# Patient Record
Sex: FEMALE | Race: WHITE | NOT HISPANIC OR LATINO
[De-identification: names, ages, dates, MRNs, and addresses within clinical notes are randomized per-mention and may not be internally consistent; named-entity substitution may affect disease eponyms.]

---

## 2018-04-24 ENCOUNTER — APPOINTMENT (OUTPATIENT)
Dept: HEART AND VASCULAR | Facility: CLINIC | Age: 83
End: 2018-04-24
Payer: MEDICARE

## 2018-04-24 VITALS
WEIGHT: 125 LBS | TEMPERATURE: 98.4 F | BODY MASS INDEX: 19.85 KG/M2 | RESPIRATION RATE: 14 BRPM | SYSTOLIC BLOOD PRESSURE: 128 MMHG | HEIGHT: 66.5 IN | OXYGEN SATURATION: 98 % | DIASTOLIC BLOOD PRESSURE: 75 MMHG | HEART RATE: 78 BPM

## 2018-04-24 DIAGNOSIS — Z87.891 PERSONAL HISTORY OF NICOTINE DEPENDENCE: ICD-10-CM

## 2018-04-24 DIAGNOSIS — Z78.9 OTHER SPECIFIED HEALTH STATUS: ICD-10-CM

## 2018-04-24 PROBLEM — Z00.00 ENCOUNTER FOR PREVENTIVE HEALTH EXAMINATION: Status: ACTIVE | Noted: 2018-04-24

## 2018-04-24 PROCEDURE — 99205 OFFICE O/P NEW HI 60 MIN: CPT | Mod: 25

## 2018-04-24 PROCEDURE — 93306 TTE W/DOPPLER COMPLETE: CPT

## 2018-05-29 ENCOUNTER — APPOINTMENT (OUTPATIENT)
Dept: HEART AND VASCULAR | Facility: CLINIC | Age: 83
End: 2018-05-29
Payer: MEDICARE

## 2018-05-29 VITALS
HEART RATE: 76 BPM | WEIGHT: 126 LBS | BODY MASS INDEX: 20.01 KG/M2 | OXYGEN SATURATION: 98 % | RESPIRATION RATE: 14 BRPM | TEMPERATURE: 98.5 F | HEIGHT: 66.5 IN

## 2018-05-29 PROCEDURE — 99214 OFFICE O/P EST MOD 30 MIN: CPT | Mod: 25

## 2018-05-29 PROCEDURE — 93000 ELECTROCARDIOGRAM COMPLETE: CPT

## 2018-05-29 PROCEDURE — 36415 COLL VENOUS BLD VENIPUNCTURE: CPT

## 2018-05-30 LAB
ALBUMIN SERPL ELPH-MCNC: 4.7 G/DL
ALP BLD-CCNC: 78 U/L
ALT SERPL-CCNC: 13 U/L
ANION GAP SERPL CALC-SCNC: 17 MMOL/L
AST SERPL-CCNC: 20 U/L
BASOPHILS # BLD AUTO: 0.03 K/UL
BASOPHILS NFR BLD AUTO: 0.4 %
BILIRUB SERPL-MCNC: 0.4 MG/DL
BUN SERPL-MCNC: 14 MG/DL
CALCIUM SERPL-MCNC: 10 MG/DL
CHLORIDE SERPL-SCNC: 101 MMOL/L
CHOLEST SERPL-MCNC: 288 MG/DL
CHOLEST/HDLC SERPL: 2.6 RATIO
CO2 SERPL-SCNC: 25 MMOL/L
CREAT SERPL-MCNC: 0.8 MG/DL
EOSINOPHIL # BLD AUTO: 0.12 K/UL
EOSINOPHIL NFR BLD AUTO: 1.6 %
GLUCOSE SERPL-MCNC: 93 MG/DL
HBA1C MFR BLD HPLC: 5.7 %
HCT VFR BLD CALC: 46 %
HDLC SERPL-MCNC: 110 MG/DL
HGB BLD-MCNC: 14.3 G/DL
IMM GRANULOCYTES NFR BLD AUTO: 0.1 %
LDLC SERPL CALC-MCNC: 162 MG/DL
LYMPHOCYTES # BLD AUTO: 2.19 K/UL
LYMPHOCYTES NFR BLD AUTO: 28.4 %
MAN DIFF?: NORMAL
MCHC RBC-ENTMCNC: 29.3 PG
MCHC RBC-ENTMCNC: 31.1 GM/DL
MCV RBC AUTO: 94.3 FL
MONOCYTES # BLD AUTO: 0.64 K/UL
MONOCYTES NFR BLD AUTO: 8.3 %
NEUTROPHILS # BLD AUTO: 4.73 K/UL
NEUTROPHILS NFR BLD AUTO: 61.2 %
PLATELET # BLD AUTO: 200 K/UL
POTASSIUM SERPL-SCNC: 4.9 MMOL/L
PROT SERPL-MCNC: 7.6 G/DL
RBC # BLD: 4.88 M/UL
RBC # FLD: 15.3 %
SODIUM SERPL-SCNC: 143 MMOL/L
TRIGL SERPL-MCNC: 81 MG/DL
TSH SERPL-ACNC: 2.58 UIU/ML
WBC # FLD AUTO: 7.72 K/UL

## 2018-07-06 ENCOUNTER — INPATIENT (INPATIENT)
Facility: HOSPITAL | Age: 83
LOS: 5 days | Discharge: NURSING, REHAB OR LONG-TERM CARE | DRG: 470 | End: 2018-07-12
Attending: ORTHOPAEDIC SURGERY | Admitting: ORTHOPAEDIC SURGERY
Payer: MEDICARE

## 2018-07-06 VITALS
TEMPERATURE: 98 F | SYSTOLIC BLOOD PRESSURE: 178 MMHG | OXYGEN SATURATION: 95 % | HEART RATE: 89 BPM | RESPIRATION RATE: 18 BRPM | DIASTOLIC BLOOD PRESSURE: 117 MMHG | WEIGHT: 119.93 LBS

## 2018-07-06 LAB
ALBUMIN SERPL ELPH-MCNC: 4.3 G/DL — SIGNIFICANT CHANGE UP (ref 3.3–5)
ALP SERPL-CCNC: 66 U/L — SIGNIFICANT CHANGE UP (ref 40–120)
ALT FLD-CCNC: 37 U/L — SIGNIFICANT CHANGE UP (ref 10–45)
ANION GAP SERPL CALC-SCNC: 16 MMOL/L — SIGNIFICANT CHANGE UP (ref 5–17)
APPEARANCE UR: CLEAR — SIGNIFICANT CHANGE UP
APTT BLD: 34.9 SEC — SIGNIFICANT CHANGE UP (ref 27.5–37.4)
AST SERPL-CCNC: 26 U/L — SIGNIFICANT CHANGE UP (ref 10–40)
BASOPHILS NFR BLD AUTO: 0.1 % — SIGNIFICANT CHANGE UP (ref 0–2)
BILIRUB SERPL-MCNC: 0.3 MG/DL — SIGNIFICANT CHANGE UP (ref 0.2–1.2)
BILIRUB UR-MCNC: NEGATIVE — SIGNIFICANT CHANGE UP
BLD GP AB SCN SERPL QL: NEGATIVE — SIGNIFICANT CHANGE UP
BUN SERPL-MCNC: 22 MG/DL — SIGNIFICANT CHANGE UP (ref 7–23)
CALCIUM SERPL-MCNC: 9.8 MG/DL — SIGNIFICANT CHANGE UP (ref 8.4–10.5)
CHLORIDE SERPL-SCNC: 98 MMOL/L — SIGNIFICANT CHANGE UP (ref 96–108)
CO2 SERPL-SCNC: 24 MMOL/L — SIGNIFICANT CHANGE UP (ref 22–31)
COLOR SPEC: YELLOW — SIGNIFICANT CHANGE UP
CREAT SERPL-MCNC: 0.74 MG/DL — SIGNIFICANT CHANGE UP (ref 0.5–1.3)
DIFF PNL FLD: ABNORMAL
GLUCOSE SERPL-MCNC: 155 MG/DL — HIGH (ref 70–99)
GLUCOSE UR QL: NEGATIVE — SIGNIFICANT CHANGE UP
HCT VFR BLD CALC: 42.1 % — SIGNIFICANT CHANGE UP (ref 34.5–45)
HGB BLD-MCNC: 13.6 G/DL — SIGNIFICANT CHANGE UP (ref 11.5–15.5)
INR BLD: 1.17 — HIGH (ref 0.88–1.16)
KETONES UR-MCNC: ABNORMAL MG/DL
LEUKOCYTE ESTERASE UR-ACNC: NEGATIVE — SIGNIFICANT CHANGE UP
LYMPHOCYTES # BLD AUTO: 14 % — SIGNIFICANT CHANGE UP (ref 13–44)
MCHC RBC-ENTMCNC: 28.8 PG — SIGNIFICANT CHANGE UP (ref 27–34)
MCHC RBC-ENTMCNC: 32.3 G/DL — SIGNIFICANT CHANGE UP (ref 32–36)
MCV RBC AUTO: 89.2 FL — SIGNIFICANT CHANGE UP (ref 80–100)
MONOCYTES NFR BLD AUTO: 9 % — SIGNIFICANT CHANGE UP (ref 2–14)
MRSA PCR RESULT.: NEGATIVE — SIGNIFICANT CHANGE UP
NEUTROPHILS NFR BLD AUTO: 76.9 % — SIGNIFICANT CHANGE UP (ref 43–77)
NITRITE UR-MCNC: NEGATIVE — SIGNIFICANT CHANGE UP
PH UR: 6 — SIGNIFICANT CHANGE UP (ref 5–8)
PLATELET # BLD AUTO: 224 K/UL — SIGNIFICANT CHANGE UP (ref 150–400)
POTASSIUM SERPL-MCNC: 4.2 MMOL/L — SIGNIFICANT CHANGE UP (ref 3.5–5.3)
POTASSIUM SERPL-SCNC: 4.2 MMOL/L — SIGNIFICANT CHANGE UP (ref 3.5–5.3)
PROT SERPL-MCNC: 7.4 G/DL — SIGNIFICANT CHANGE UP (ref 6–8.3)
PROT UR-MCNC: ABNORMAL MG/DL
PROTHROM AB SERPL-ACNC: 13 SEC — HIGH (ref 9.8–12.7)
RBC # BLD: 4.72 M/UL — SIGNIFICANT CHANGE UP (ref 3.8–5.2)
RBC # FLD: 14 % — SIGNIFICANT CHANGE UP (ref 10.3–16.9)
RH IG SCN BLD-IMP: POSITIVE — SIGNIFICANT CHANGE UP
S AUREUS DNA NOSE QL NAA+PROBE: POSITIVE
SODIUM SERPL-SCNC: 138 MMOL/L — SIGNIFICANT CHANGE UP (ref 135–145)
SP GR SPEC: 1.01 — SIGNIFICANT CHANGE UP (ref 1–1.03)
UROBILINOGEN FLD QL: 0.2 E.U./DL — SIGNIFICANT CHANGE UP
WBC # BLD: 10.1 K/UL — SIGNIFICANT CHANGE UP (ref 3.8–10.5)
WBC # FLD AUTO: 10.1 K/UL — SIGNIFICANT CHANGE UP (ref 3.8–10.5)

## 2018-07-06 PROCEDURE — 71045 X-RAY EXAM CHEST 1 VIEW: CPT | Mod: 26

## 2018-07-06 PROCEDURE — 70450 CT HEAD/BRAIN W/O DYE: CPT | Mod: 26

## 2018-07-06 PROCEDURE — 73502 X-RAY EXAM HIP UNI 2-3 VIEWS: CPT | Mod: 26,LT

## 2018-07-06 PROCEDURE — 93010 ELECTROCARDIOGRAM REPORT: CPT

## 2018-07-06 PROCEDURE — 99285 EMERGENCY DEPT VISIT HI MDM: CPT | Mod: 25

## 2018-07-06 PROCEDURE — 72170 X-RAY EXAM OF PELVIS: CPT | Mod: 26,59

## 2018-07-06 PROCEDURE — 99222 1ST HOSP IP/OBS MODERATE 55: CPT

## 2018-07-06 PROCEDURE — 72192 CT PELVIS W/O DYE: CPT | Mod: 26

## 2018-07-06 PROCEDURE — 93306 TTE W/DOPPLER COMPLETE: CPT | Mod: 26

## 2018-07-06 RX ORDER — BIMATOPROST 0.3 MG/ML
1 SOLUTION/ DROPS OPHTHALMIC
Qty: 0 | Refills: 0 | COMMUNITY

## 2018-07-06 RX ORDER — TRAMADOL HYDROCHLORIDE 50 MG/1
50 TABLET ORAL EVERY 4 HOURS
Qty: 0 | Refills: 0 | Status: DISCONTINUED | OUTPATIENT
Start: 2018-07-06 | End: 2018-07-12

## 2018-07-06 RX ORDER — ACETAZOLAMIDE 250 MG/1
1 TABLET ORAL
Qty: 0 | Refills: 0 | COMMUNITY

## 2018-07-06 RX ORDER — SODIUM CHLORIDE 9 MG/ML
1000 INJECTION, SOLUTION INTRAVENOUS
Qty: 0 | Refills: 0 | Status: DISCONTINUED | OUTPATIENT
Start: 2018-07-08 | End: 2018-07-09

## 2018-07-06 RX ORDER — THYROID 120 MG
1 TABLET ORAL
Qty: 0 | Refills: 0 | COMMUNITY

## 2018-07-06 RX ORDER — THYROID 120 MG
30 TABLET ORAL DAILY
Qty: 0 | Refills: 0 | Status: DISCONTINUED | OUTPATIENT
Start: 2018-07-06 | End: 2018-07-12

## 2018-07-06 RX ORDER — HYDROMORPHONE HYDROCHLORIDE 2 MG/ML
0.5 INJECTION INTRAMUSCULAR; INTRAVENOUS; SUBCUTANEOUS EVERY 6 HOURS
Qty: 0 | Refills: 0 | Status: DISCONTINUED | OUTPATIENT
Start: 2018-07-06 | End: 2018-07-06

## 2018-07-06 RX ORDER — METOCLOPRAMIDE HCL 10 MG
10 TABLET ORAL EVERY 6 HOURS
Qty: 0 | Refills: 0 | Status: DISCONTINUED | OUTPATIENT
Start: 2018-07-06 | End: 2018-07-12

## 2018-07-06 RX ORDER — POLYETHYLENE GLYCOL 3350 17 G/17G
17 POWDER, FOR SOLUTION ORAL DAILY
Qty: 0 | Refills: 0 | Status: DISCONTINUED | OUTPATIENT
Start: 2018-07-06 | End: 2018-07-12

## 2018-07-06 RX ORDER — METOPROLOL TARTRATE 50 MG
50 TABLET ORAL
Qty: 0 | Refills: 0 | Status: DISCONTINUED | OUTPATIENT
Start: 2018-07-06 | End: 2018-07-12

## 2018-07-06 RX ORDER — SENNA PLUS 8.6 MG/1
2 TABLET ORAL AT BEDTIME
Qty: 0 | Refills: 0 | Status: DISCONTINUED | OUTPATIENT
Start: 2018-07-06 | End: 2018-07-12

## 2018-07-06 RX ORDER — SODIUM CHLORIDE 9 MG/ML
1000 INJECTION, SOLUTION INTRAVENOUS
Qty: 0 | Refills: 0 | Status: DISCONTINUED | OUTPATIENT
Start: 2018-07-06 | End: 2018-07-06

## 2018-07-06 RX ORDER — ACETAMINOPHEN 500 MG
650 TABLET ORAL EVERY 6 HOURS
Qty: 0 | Refills: 0 | Status: DISCONTINUED | OUTPATIENT
Start: 2018-07-06 | End: 2018-07-12

## 2018-07-06 RX ORDER — HEPARIN SODIUM 5000 [USP'U]/ML
5000 INJECTION INTRAVENOUS; SUBCUTANEOUS EVERY 8 HOURS
Qty: 0 | Refills: 0 | Status: DISCONTINUED | OUTPATIENT
Start: 2018-07-06 | End: 2018-07-08

## 2018-07-06 RX ORDER — TRAMADOL HYDROCHLORIDE 50 MG/1
25 TABLET ORAL EVERY 4 HOURS
Qty: 0 | Refills: 0 | Status: DISCONTINUED | OUTPATIENT
Start: 2018-07-06 | End: 2018-07-12

## 2018-07-06 RX ORDER — ERGOCALCIFEROL 1.25 MG/1
50000 CAPSULE ORAL
Qty: 0 | Refills: 0 | Status: DISCONTINUED | OUTPATIENT
Start: 2018-07-06 | End: 2018-07-09

## 2018-07-06 RX ORDER — MORPHINE SULFATE 50 MG/1
2 CAPSULE, EXTENDED RELEASE ORAL EVERY 4 HOURS
Qty: 0 | Refills: 0 | Status: DISCONTINUED | OUTPATIENT
Start: 2018-07-06 | End: 2018-07-12

## 2018-07-06 RX ORDER — ACETAMINOPHEN 500 MG
650 TABLET ORAL EVERY 6 HOURS
Qty: 0 | Refills: 0 | Status: DISCONTINUED | OUTPATIENT
Start: 2018-07-06 | End: 2018-07-09

## 2018-07-06 RX ORDER — ACETAZOLAMIDE 250 MG/1
250 TABLET ORAL
Qty: 0 | Refills: 0 | Status: DISCONTINUED | OUTPATIENT
Start: 2018-07-06 | End: 2018-07-12

## 2018-07-06 RX ORDER — DOCUSATE SODIUM 100 MG
100 CAPSULE ORAL THREE TIMES A DAY
Qty: 0 | Refills: 0 | Status: DISCONTINUED | OUTPATIENT
Start: 2018-07-06 | End: 2018-07-12

## 2018-07-06 RX ORDER — CHOLECALCIFEROL (VITAMIN D3) 125 MCG
0 CAPSULE ORAL
Qty: 0 | Refills: 0 | COMMUNITY

## 2018-07-06 RX ORDER — HYDROMORPHONE HYDROCHLORIDE 2 MG/ML
2 INJECTION INTRAMUSCULAR; INTRAVENOUS; SUBCUTANEOUS EVERY 4 HOURS
Qty: 0 | Refills: 0 | Status: DISCONTINUED | OUTPATIENT
Start: 2018-07-06 | End: 2018-07-06

## 2018-07-06 RX ORDER — LEVOTHYROXINE SODIUM 125 MCG
0 TABLET ORAL
Qty: 0 | Refills: 0 | COMMUNITY

## 2018-07-06 RX ORDER — LATANOPROST 0.05 MG/ML
1 SOLUTION/ DROPS OPHTHALMIC; TOPICAL AT BEDTIME
Qty: 0 | Refills: 0 | Status: DISCONTINUED | OUTPATIENT
Start: 2018-07-06 | End: 2018-07-09

## 2018-07-06 RX ORDER — APIXABAN 2.5 MG/1
1 TABLET, FILM COATED ORAL
Qty: 0 | Refills: 0 | COMMUNITY

## 2018-07-06 RX ORDER — BIMATOPROST 0.3 MG/ML
1 SOLUTION/ DROPS OPHTHALMIC AT BEDTIME
Qty: 0 | Refills: 0 | Status: DISCONTINUED | OUTPATIENT
Start: 2018-07-06 | End: 2018-07-12

## 2018-07-06 RX ORDER — FAMOTIDINE 10 MG/ML
20 INJECTION INTRAVENOUS EVERY 12 HOURS
Qty: 0 | Refills: 0 | Status: DISCONTINUED | OUTPATIENT
Start: 2018-07-06 | End: 2018-07-06

## 2018-07-06 RX ORDER — METOPROLOL TARTRATE 50 MG
1 TABLET ORAL
Qty: 0 | Refills: 0 | COMMUNITY

## 2018-07-06 RX ADMIN — HEPARIN SODIUM 5000 UNIT(S): 5000 INJECTION INTRAVENOUS; SUBCUTANEOUS at 21:13

## 2018-07-06 RX ADMIN — Medication 100 MILLIGRAM(S): at 21:14

## 2018-07-06 RX ADMIN — Medication 50 MILLIGRAM(S): at 17:45

## 2018-07-06 RX ADMIN — ACETAZOLAMIDE 250 MILLIGRAM(S): 250 TABLET ORAL at 21:14

## 2018-07-06 RX ADMIN — Medication 50 MILLIGRAM(S): at 06:02

## 2018-07-06 RX ADMIN — Medication 650 MILLIGRAM(S): at 06:02

## 2018-07-06 RX ADMIN — Medication 1 TABLET(S): at 12:18

## 2018-07-06 RX ADMIN — BIMATOPROST 1 DROP(S): 0.3 SOLUTION/ DROPS OPHTHALMIC at 21:13

## 2018-07-06 RX ADMIN — SENNA PLUS 2 TABLET(S): 8.6 TABLET ORAL at 21:14

## 2018-07-06 RX ADMIN — HEPARIN SODIUM 5000 UNIT(S): 5000 INJECTION INTRAVENOUS; SUBCUTANEOUS at 13:58

## 2018-07-06 RX ADMIN — Medication 650 MILLIGRAM(S): at 19:21

## 2018-07-06 RX ADMIN — LATANOPROST 1 DROP(S): 0.05 SOLUTION/ DROPS OPHTHALMIC; TOPICAL at 21:14

## 2018-07-06 RX ADMIN — Medication 650 MILLIGRAM(S): at 13:18

## 2018-07-06 RX ADMIN — Medication 650 MILLIGRAM(S): at 18:21

## 2018-07-06 RX ADMIN — Medication 650 MILLIGRAM(S): at 07:00

## 2018-07-06 RX ADMIN — Medication 650 MILLIGRAM(S): at 12:18

## 2018-07-06 NOTE — H&P ADULT - NSHPPHYSICALEXAM_GEN_ALL_CORE
GEN: AOx3, NAD, lying in bed  MSK: LLE shortened and externally rotated, hip ROM restricted by pain, NVID, +2 pop/PT/DP, able to flex/extend toes/ankle, SILT, compartments soft

## 2018-07-06 NOTE — ED ADULT NURSE NOTE - CHIEF COMPLAINT QUOTE
pt BIBA from home s/p mechanical fall at home around 5pm denies CP SOB lightheadedness dizziness weakness. Complains of left hip pain no numbness/ tingling. Bumped her head denies LOC pt takes eliquis

## 2018-07-06 NOTE — ED ADULT NURSE REASSESSMENT NOTE - NS ED NURSE REASSESS COMMENT FT1
Pt observed to have oxygen saturation of 87% on room air. Pt. unable to re position herself effectively whereby she can breath deep due to pain. Pt requesting to stay where she is. pt was placed on NC 2L. Oxygen saturation returns to 95% supplemental oxygen.

## 2018-07-06 NOTE — ED PROVIDER NOTE - MEDICAL DECISION MAKING DETAILS
ct/ labs/ x ray to explore further care of left hip / head injuries ct/ labs/ x ray to explore further care of left hip / head injuries with ortho admission for left hip fn FX

## 2018-07-06 NOTE — H&P ADULT - ASSESSMENT
89y Female s/p L FNF  - for OR pending clearance  - analgesia   - nwb  - OOB  - DVT ppx-Eliquis held  - Diet-NPO  - f/u labs  - Dispo planning 89y Female s/p L FNF. managing patient pending hemirarthroplasty clearance  - for OR pending clearance  - analgesia   - nwb  - OOB  - DVT ppx-Eliquis held

## 2018-07-06 NOTE — ED PROVIDER NOTE - OBJECTIVE STATEMENT
accidental fall tonight + hip head and injury left hip and made it to chair by self but to ED for further care and planning accidental fall tonight + hip head and injury left hip and made it to chair by self but to ED for further care and planning Hx AF on eliquis HTN hypothyroid

## 2018-07-06 NOTE — ED PROVIDER NOTE - ATTENDING CONTRIBUTION TO CARE
89F s/p accidental fall today, +L hip pain.  +eliquis, hit head. no LOC. no vomiting. unable to ambulate after fall. no chest pain, no SOB.   gen- nad  heent- ncat, clear conj  cv -rrr  lungs -ctab  abd - soft, nt, nd  ext -wwp, +L hip pain  neuro -aox3, krishna  L hip fracture, no m/s/b on CT. ortho consulted

## 2018-07-06 NOTE — H&P ADULT - HISTORY OF PRESENT ILLNESS
This is an 89 year old woman who fell and sustained a L femoral neck fracture yesterday afternoon. She has a history of a fib, on eliquis (last dose 1700 7/5). Head CT negative. Denies LOC, dizziness, bleeding.

## 2018-07-06 NOTE — ED ADULT NURSE NOTE - OBJECTIVE STATEMENT
Gary Goldberg MD 88 y/o female c/o head pain s/p mechanical fall today. Pt reports hitting her head on object when falling. Pt. denies LOC. Pt reports taking her BP meds and normal BP; "120's over 80's." however pt has elevated BP upon triage, see flowsheet. Pt speaks clear, +PERRL, MAEx4, has unlabored breathing. Abd soft nt nd. Skin dry, warm. 88 y/o female c/o head pain s/p mechanical fall today. Pt reports hitting her head on object when falling. Pt. denies LOC. Reports being legally blind. Pt reports taking her BP meds and normal BP; "120's over 80's." however pt has elevated BP upon triage, see flowsheet. Pt reports left hip pain. Pt able to bend legs and legs are of equal length, however at the moment pt has limited ROM to LLE. Pt speaks clear, +PERRL, MAEx4, has unlabored breathing. Abd soft nt nd. Skin dry, warm.

## 2018-07-06 NOTE — ED PROVIDER NOTE - PHYSICAL EXAMINATION
left hip tenderness with ROM + skin intact pulses intact compartments soft + moves extremity independently albeit tender at left hip to log roll / heel strike

## 2018-07-06 NOTE — PROGRESS NOTE ADULT - SUBJECTIVE AND OBJECTIVE BOX
Ortho Preop Note    Patient is a 89y old  Female who presents with a chief complaint of L FNF (06 Jul 2018 04:42)    Diagnosis: L FNF garden 4  Procedure: L hip kim  Surgeon: Cain                          [id number]   10.1  )-----------( 224      ( 06 Jul 2018 02:20 )             42.1     07-06    138  |  98  |  22  ----------------------------<  155<H>  4.2   |  24  |  0.74    Ca    9.8      06 Jul 2018 02:20    TPro  7.4  /  Alb  4.3  /  TBili  0.3  /  DBili  x   /  AST  26  /  ALT  37  /  AlkPhos  66  07-06    PT/INR - ( 06 Jul 2018 02:22 )   PT: 13.0 sec;   INR: 1.17          PTT - ( 06 Jul 2018 02:22 )  PTT:34.9 sec      [x ] Type & Screen  [ x] CBC  [x ] BMP  [ x] PT/PTT/INR  [ x] Urinalysis  [x ] Chest X-ray  [x ] EKG  [ x] NPO/IVF  [x ] Consent  [ ] Clearance  [ x] Added on to OR Schedule  [x ] Anti-coagulation held  [x ] MRSA/MSSA Nasal Screen     Assessment & Plan:  89yFemale with L FNF  -For OR 7/6    Ortho Pager [phone]

## 2018-07-06 NOTE — CONSULT NOTE ADULT - ASSESSMENT
88yo female with PMHx of HTN, Afib on eliquis, hypothyroidism, glaucoma presents with left femoral neck fracture. Consult for pre-op optimization.     Pre-op optimization- Patient with RCRI=0. METS >4. No current cardiac symptoms.  -Patient is low risk for a low risk procedure.  -Patient is optimized from a cardiac standpoint for a medically necessary procedure.  -Continue with home dose of metoprolol perioperatively.    Afib on Eliquis-EKG currently NSR. Likely paroxysmal. Holding eliquis. [id number]=6.   -AC per surgical team.  -Patient should have bridging anticoagulation.    Aortic Stenosis-Patient with AS on echo. Valve area 1.6 with peak gradient 33mmHg. Moderate AS. EF 55-60%  -Be cautious with with perioperative IVF.    Hypothyroidism-Check TSH  -Continue thyroid    HTN-  Continue with Metoprolol.     Discussed with attending. Will continue to follow.

## 2018-07-06 NOTE — CONSULT NOTE ADULT - SUBJECTIVE AND OBJECTIVE BOX
HPI:  This is an 89 year old woman who fell and sustained a L femoral neck fracture yesterday afternoon. She has a history of a fib, on eliquis (last dose 1700 7/5). Head CT negative. Denies LOC, dizziness, bleeding. (06 Jul 2018 04:42)      ROS: A 10-point review of systems was otherwise negative.    PAST MEDICAL & SURGICAL HISTORY:  Hypothyroidism, unspecified type  Chronic atrial fibrillation  No significant past surgical history      SOCIAL HISTORY:  FAMILY HISTORY:  No pertinent family history in first degree relatives      ALLERGIES: 	  No Known Allergies            MEDICATIONS:  acetaminophen   Tablet 650 milliGRAM(s) Oral every 6 hours PRN  acetaminophen   Tablet. 650 milliGRAM(s) Oral every 6 hours PRN  acetazolamide    Tablet 250 milliGRAM(s) Oral two times a day  bimatoprost 0.01% Ophthalmic Solution 1 Drop(s) Both EYES at bedtime  docusate sodium 100 milliGRAM(s) Oral three times a day  ergocalciferol 15886 Unit(s) Oral every week  heparin  Injectable 5000 Unit(s) SubCutaneous every 8 hours  latanoprost 0.005% Ophthalmic Solution 1 Drop(s) Both EYES at bedtime  metoclopramide Injectable 10 milliGRAM(s) IV Push every 6 hours PRN  metoprolol succinate ER 50 milliGRAM(s) Oral two times a day  morphine  - Injectable 2 milliGRAM(s) IV Push every 4 hours PRN  multivitamin 1 Tablet(s) Oral daily  polyethylene glycol 3350 17 Gram(s) Oral daily  senna 2 Tablet(s) Oral at bedtime  thyroid 30 milliGRAM(s) Oral daily  traMADol 25 milliGRAM(s) Oral every 4 hours PRN  traMADol 50 milliGRAM(s) Oral every 4 hours PRN      PHYSICAL EXAM:  T(C): 35.6 (07-06-18 @ 15:37), Max: 36.8 (07-06-18 @ 08:42)  HR: 86 (07-06-18 @ 15:37) (81 - 101)  BP: 145/79 (07-06-18 @ 15:37) (131/83 - 183/113)  RR: 16 (07-06-18 @ 15:37) (16 - 18)  SpO2: 95% (07-06-18 @ 15:37) (95% - 98%)  Wt(kg): --    GEN: Awake, comfortable. NAD.   HEENT: NCAT, PERRL, EOMI. Mucosa moist. No JVD.   RESP: CTA b/l  CV: RRR, normal s1/s2. No m/r/g.  ABD: Soft, NTND. BS+  EXT: Warm. No edema, clubbing, or cyanosis.   NEURO: AAOx3. No focal deficits.    I&O's Summary    05 Jul 2018 07:01  -  06 Jul 2018 07:00  --------------------------------------------------------  IN: 0 mL / OUT: 900 mL / NET: -900 mL    06 Jul 2018 07:01  -  06 Jul 2018 18:15  --------------------------------------------------------  IN: 220 mL / OUT: 400 mL / NET: -180 mL      Height (cm): 167.6 (07-06 @ 06:10)  Weight (kg): 54.4 (07-06 @ 01:33)  BMI (kg/m2): 19.4 (07-06 @ 06:10)  BSA (m2): 1.61 (07-06 @ 06:10)  	  LABS:	 	    CARDIAC MARKERS:                                  13.6   10.1  )-----------( 224      ( 06 Jul 2018 02:20 )             42.1     07-06    138  |  98  |  22  ----------------------------<  155<H>  4.2   |  24  |  0.74    Ca    9.8      06 Jul 2018 02:20    TPro  7.4  /  Alb  4.3  /  TBili  0.3  /  DBili  x   /  AST  26  /  ALT  37  /  AlkPhos  66  07-06    proBNP:   Lipid Profile:   HgA1c:   TSH:     TELEMETRY: 	    ECG:  	  RADIOLOGY:   ECHO:  STRESS:  CATH: HPI:  This is an 89 year old woman who fell and sustained a L femoral neck fracture yesterday afternoon. She has a history of a fib, on eliquis (last dose 1700 7/5). Head CT negative. Denies LOC, dizziness, bleeding. (06 Jul 2018 04:42)  88yo female with PMHx of HTN, Afib on eliquis, hypothyroidism, glaucoma presents with left femoral neck fracture. Patient had been walking in her living room with socks on. She slipped and fell on her left side. Denies hitting her head, no LOC, dizziness. Patient complains of pain at the site but otherwise denies any fevers, chills, NVD, CP, SOB, palpitations, abdominal pain, hematuria, hematochezia or melena.     Patient has RCRI=0 (No CAD, CHF, CVA, CKD Cr >2 or DM on insulin). Patient has METS of at least 4, able to walk up and down stairs and walk at least 4 blocks on an incline. Patient with no current cardiac symptoms.       ROS: A 10-point review of systems was otherwise negative.    PAST MEDICAL & SURGICAL HISTORY:  Hypothyroidism, unspecified type  Chronic atrial fibrillation  No significant past surgical history      SOCIAL HISTORY:  FAMILY HISTORY:  No pertinent family history in first degree relatives      ALLERGIES: 	  No Known Allergies            MEDICATIONS:  acetaminophen   Tablet 650 milliGRAM(s) Oral every 6 hours PRN  acetaminophen   Tablet. 650 milliGRAM(s) Oral every 6 hours PRN  acetazolamide    Tablet 250 milliGRAM(s) Oral two times a day  bimatoprost 0.01% Ophthalmic Solution 1 Drop(s) Both EYES at bedtime  docusate sodium 100 milliGRAM(s) Oral three times a day  ergocalciferol 79997 Unit(s) Oral every week  heparin  Injectable 5000 Unit(s) SubCutaneous every 8 hours  latanoprost 0.005% Ophthalmic Solution 1 Drop(s) Both EYES at bedtime  metoclopramide Injectable 10 milliGRAM(s) IV Push every 6 hours PRN  metoprolol succinate ER 50 milliGRAM(s) Oral two times a day  morphine  - Injectable 2 milliGRAM(s) IV Push every 4 hours PRN  multivitamin 1 Tablet(s) Oral daily  polyethylene glycol 3350 17 Gram(s) Oral daily  senna 2 Tablet(s) Oral at bedtime  thyroid 30 milliGRAM(s) Oral daily  traMADol 25 milliGRAM(s) Oral every 4 hours PRN  traMADol 50 milliGRAM(s) Oral every 4 hours PRN      PHYSICAL EXAM:  T(C): 35.6 (07-06-18 @ 15:37), Max: 36.8 (07-06-18 @ 08:42)  HR: 86 (07-06-18 @ 15:37) (81 - 101)  BP: 145/79 (07-06-18 @ 15:37) (131/83 - 183/113)  RR: 16 (07-06-18 @ 15:37) (16 - 18)  SpO2: 95% (07-06-18 @ 15:37) (95% - 98%)  Wt(kg): --    GEN: Awake, comfortable. NAD.   HEENT: NCAT, PERRL, EOMI. Mucosa moist. No JVD.   RESP: CTA b/l  CV: RRR, normal s1/s2. No m/r/g.  ABD: Soft, NTND. BS+  EXT: Warm. No edema, clubbing, or cyanosis. Tenderness to palpation over left hip and thigh.   NEURO: AAOx3. No focal deficits.    I&O's Summary    05 Jul 2018 07:01  -  06 Jul 2018 07:00  --------------------------------------------------------  IN: 0 mL / OUT: 900 mL / NET: -900 mL    06 Jul 2018 07:01  -  06 Jul 2018 18:15  --------------------------------------------------------  IN: 220 mL / OUT: 400 mL / NET: -180 mL      Height (cm): 167.6 (07-06 @ 06:10)  Weight (kg): 54.4 (07-06 @ 01:33)  BMI (kg/m2): 19.4 (07-06 @ 06:10)  BSA (m2): 1.61 (07-06 @ 06:10)  	  LABS:	 	    CARDIAC MARKERS:                                  13.6   10.1  )-----------( 224      ( 06 Jul 2018 02:20 )             42.1     07-06    138  |  98  |  22  ----------------------------<  155<H>  4.2   |  24  |  0.74    Ca    9.8      06 Jul 2018 02:20    TPro  7.4  /  Alb  4.3  /  TBili  0.3  /  DBili  x   /  AST  26  /  ALT  37  /  AlkPhos  66  07-06    proBNP:   Lipid Profile:   HgA1c:   TSH:     TELEMETRY: 	    ECG:  	NSR  RADIOLOGY:   ECHO:The left atrial size is normal. Right atrial size is normal.There is mild   aortic valve thickening. No aortic regurgitation noted. The calculated   aortic valve area using the continuity equation is 1.6 cm2. The calculated   aortic valve area indexed to body surface area is 1 cm2/m2. The peak pressure   gradient is 33 mmHg. The mean pressure gradient is 18 mmHg. The   dimensionless index (ratio of LVOTvelocity to aortic velocity) was calculated to be 0.50.  There is mild mitral valve thickening. There is mild mitral annular   calcification. There is trace mitral regurgitation.  Structurally normal   tricuspid valve. There is mild tricuspid regurgitation.The pulmonary   artery systolic pressure is estimated to be 37 mmHg.The pulmonic valve is not   well visualized. There is trace pulmonic regurgitation.  The right ventricle   is normal in size and function.There is mild concentric left ventricularhypertrophy. The left ventricular wall motion is normal. The left   ventricular ejection fraction is estimated to be 55-60%  No aortic root   dilatation.There is no pericardial effusion.  STRESS:None  CATH:None

## 2018-07-06 NOTE — CONSULT NOTE ADULT - ATTENDING COMMENTS
Assessment: Patient personally seen and examined myself during rounds with the Physician Assistant/House Staff/Nurse Practitioner   ON DATE 7/6/18  Physician Assistant/House Staff/Nurse Practitioner note read, including vitals, physical findings, laboratory data, and radiological reports.   Revisions included below.   Direct personal management at bed side and extensive interpretation of the data.    Plan was outlined and discussed in details with the Physician Assistant/House Staff/Nurse Practitioner.    Decision making of high complexity   Risk high of complications, morbidity, and/or mortality  Assessment and Action taken for acute disease activity to reflect the level of care provided:  -Hemodynamic evaluation and support  -Medication reconciliation  -Review laboratory data  -EKG reviewed   -Echo reviewed   -ACS assessment and treatment as applicable  -Heart failure assessment and treatment as applicable  -Cardiac Telemetry reviewed  -Interdisciplinary discussion with IC / EP / HF / CTS teams as needed  TIME SPENT in evaluation and management, reassessments, review and interpretation of labs and x-rays, and hemodynamic management, formulating a plan and coordinating care: ___50____ MIN.  Time does not include procedural time.

## 2018-07-07 LAB
ANION GAP SERPL CALC-SCNC: 17 MMOL/L — SIGNIFICANT CHANGE UP (ref 5–17)
BLD GP AB SCN SERPL QL: NEGATIVE — SIGNIFICANT CHANGE UP
BUN SERPL-MCNC: 15 MG/DL — SIGNIFICANT CHANGE UP (ref 7–23)
CALCIUM SERPL-MCNC: 9.5 MG/DL — SIGNIFICANT CHANGE UP (ref 8.4–10.5)
CHLORIDE SERPL-SCNC: 98 MMOL/L — SIGNIFICANT CHANGE UP (ref 96–108)
CO2 SERPL-SCNC: 22 MMOL/L — SIGNIFICANT CHANGE UP (ref 22–31)
CREAT SERPL-MCNC: 0.77 MG/DL — SIGNIFICANT CHANGE UP (ref 0.5–1.3)
GLUCOSE SERPL-MCNC: 139 MG/DL — HIGH (ref 70–99)
HCT VFR BLD CALC: 44.5 % — SIGNIFICANT CHANGE UP (ref 34.5–45)
HGB BLD-MCNC: 14 G/DL — SIGNIFICANT CHANGE UP (ref 11.5–15.5)
MCHC RBC-ENTMCNC: 28.6 PG — SIGNIFICANT CHANGE UP (ref 27–34)
MCHC RBC-ENTMCNC: 31.5 G/DL — LOW (ref 32–36)
MCV RBC AUTO: 91 FL — SIGNIFICANT CHANGE UP (ref 80–100)
PLATELET # BLD AUTO: 227 K/UL — SIGNIFICANT CHANGE UP (ref 150–400)
POTASSIUM SERPL-MCNC: 3.9 MMOL/L — SIGNIFICANT CHANGE UP (ref 3.5–5.3)
POTASSIUM SERPL-SCNC: 3.9 MMOL/L — SIGNIFICANT CHANGE UP (ref 3.5–5.3)
RBC # BLD: 4.89 M/UL — SIGNIFICANT CHANGE UP (ref 3.8–5.2)
RBC # FLD: 14 % — SIGNIFICANT CHANGE UP (ref 10.3–16.9)
RH IG SCN BLD-IMP: POSITIVE — SIGNIFICANT CHANGE UP
SODIUM SERPL-SCNC: 137 MMOL/L — SIGNIFICANT CHANGE UP (ref 135–145)
WBC # BLD: 9.4 K/UL — SIGNIFICANT CHANGE UP (ref 3.8–10.5)
WBC # FLD AUTO: 9.4 K/UL — SIGNIFICANT CHANGE UP (ref 3.8–10.5)

## 2018-07-07 PROCEDURE — 99223 1ST HOSP IP/OBS HIGH 75: CPT | Mod: GC

## 2018-07-07 RX ADMIN — HEPARIN SODIUM 5000 UNIT(S): 5000 INJECTION INTRAVENOUS; SUBCUTANEOUS at 14:12

## 2018-07-07 RX ADMIN — HEPARIN SODIUM 5000 UNIT(S): 5000 INJECTION INTRAVENOUS; SUBCUTANEOUS at 05:20

## 2018-07-07 RX ADMIN — Medication 30 MILLIGRAM(S): at 05:20

## 2018-07-07 RX ADMIN — Medication 50 MILLIGRAM(S): at 05:20

## 2018-07-07 RX ADMIN — ACETAZOLAMIDE 250 MILLIGRAM(S): 250 TABLET ORAL at 17:38

## 2018-07-07 RX ADMIN — Medication 100 MILLIGRAM(S): at 14:12

## 2018-07-07 RX ADMIN — Medication 650 MILLIGRAM(S): at 08:26

## 2018-07-07 RX ADMIN — ACETAZOLAMIDE 250 MILLIGRAM(S): 250 TABLET ORAL at 05:20

## 2018-07-07 RX ADMIN — ERGOCALCIFEROL 50000 UNIT(S): 1.25 CAPSULE ORAL at 14:12

## 2018-07-07 RX ADMIN — Medication 1 TABLET(S): at 14:13

## 2018-07-07 RX ADMIN — HEPARIN SODIUM 5000 UNIT(S): 5000 INJECTION INTRAVENOUS; SUBCUTANEOUS at 21:07

## 2018-07-07 RX ADMIN — Medication 50 MILLIGRAM(S): at 17:38

## 2018-07-07 RX ADMIN — BIMATOPROST 1 DROP(S): 0.3 SOLUTION/ DROPS OPHTHALMIC at 21:06

## 2018-07-07 RX ADMIN — POLYETHYLENE GLYCOL 3350 17 GRAM(S): 17 POWDER, FOR SOLUTION ORAL at 14:13

## 2018-07-07 RX ADMIN — Medication 100 MILLIGRAM(S): at 05:20

## 2018-07-07 RX ADMIN — Medication 650 MILLIGRAM(S): at 09:26

## 2018-07-07 NOTE — CONSULT NOTE ADULT - ATTENDING COMMENTS
Pre-operative assessment for left femoral neck fracture, s/p fall.  RCRI: 0; Mets >4 <10. Intermediate risk procedure.  Combined clinical and surgical risk is low, no further testing is needed.  EKG reviewed.  Last dose of eliquis 7/5.  Continue with metoprolol gordo-operatively, should be given in AM prior to surgery.  Will need telemetry post-op.  F/up Vitamin D level. Minimize opiate use gordo-operatively to avoid delirium.  C/w thyroid medication.  Rest as above.  Thanks for the consult and will continue to follow up. Pre-operative assessment for left femoral neck fracture, s/p fall.  RCRI: 0; Mets >4 <10. Intermediate risk procedure. EKG reviewed.  Combined clinical and surgical risk is low, no further testing is needed. Medically optimized for planned intervention.  Last dose of eliquis 7/5.  Continue with metoprolol gordo-operatively, should be given in AM prior to surgery.  Will need telemetry post-op.  F/up Vitamin D level. Minimize opiate use gordo-operatively to avoid delirium.  C/w thyroid medication.  Rest as above.  Thanks for the consult and will continue to follow up. Pre-operative assessment for left femoral neck fracture, s/p fall.  RCRI: 0; Mets >4 <10. Intermediate risk procedure. EKG reviewed.  Combined clinical and surgical risk is low, no further testing is needed. Medically optimized for planned intervention.  Last dose of eliquis 7/5.  Continue with metoprolol gordo-operatively, should be given in AM prior to surgery.  Will need telemetry post-op.  F/up Vitamin D level, noted osteopenia on XR.  Minimize opiate use gordo-operatively to avoid delirium.  C/w thyroid medication.  Rest as above.  Thanks for the consult and will continue to follow up.

## 2018-07-07 NOTE — CONSULT NOTE ADULT - SUBJECTIVE AND OBJECTIVE BOX
JOSE LUIS CHRISTIANSON  89y  Female      Patient is a 89y old  Female who presents with a chief complaint of L FNF (2018 04:42)      INTERVAL HPI/OVERNIGHT EVENTS:        REVIEW OF SYSTEMS:  CONSTITUTIONAL: No fever, weight loss, or fatigue  EYES: No eye pain, visual disturbances, or discharge  ENMT:  No difficulty hearing, tinnitus, vertigo; No sinus or throat pain  NECK: No pain or stiffness  BREASTS: No pain, masses, or nipple discharge  RESPIRATORY: No cough, wheezing, chills or hemoptysis; No shortness of breath  CARDIOVASCULAR: No chest pain, palpitations, dizziness, or leg swelling  GASTROINTESTINAL: No abdominal or epigastric pain. No nausea, vomiting, or hematemesis; No diarrhea or constipation. No melena or hematochezia.  GENITOURINARY: No dysuria, frequency, hematuria, or incontinence  NEUROLOGICAL: No headaches, memory loss, loss of strength, numbness, or tremors  SKIN: No itching, burning, rashes, or lesions   LYMPH NODES: No enlarged glands  ENDOCRINE: No heat or cold intolerance; No hair loss  MUSCULOSKELETAL: No joint pain or swelling; No muscle, back, or extremity pain  PSYCHIATRIC: No depression, anxiety, mood swings, or difficulty sleeping  HEME/LYMPH: No easy bruising, or bleeding gums  ALLERY AND IMMUNOLOGIC: No hives or eczema    T(C): 35.6 (18 @ 09:11), Max: 37.2 (18 @ 05:17)  HR: 70 (18 @ 09:11) (70 - 86)  BP: 98/59 (18 @ 09:11) (98/59 - 145/92)  RR: 22 (18 @ 09:11) (16 - 22)  SpO2: 94% (18 @ 09:11) (94% - 95%)  Wt(kg): --Vital Signs Last 24 Hrs  T(C): 35.6 (2018 09:11), Max: 37.2 (2018 05:17)  T(F): 96.1 (2018 09:11), Max: 99 (2018 05:17)  HR: 70 (2018 09:11) (70 - 86)  BP: 98/59 (2018 09:11) (98/59 - 145/92)  BP(mean): --  RR: 22 (2018 09:11) (16 - 22)  SpO2: 94% (2018 09:11) (94% - 95%)    PHYSICAL EXAM:  GENERAL: NAD, well-groomed, well-developed  HEAD:  Atraumatic, Normocephalic  EYES: EOMI, PERRLA, conjunctiva and sclera clear  ENMT: No tonsillar erythema, exudates, or enlargement; Moist mucous membranes, Good dentition, No lesions  NECK: Supple, No JVD, Normal thyroid  NERVOUS SYSTEM:  Alert & Oriented X3, Good concentration; Motor Strength 5/5 B/L upper and lower extremities; DTRs 2+ intact and symmetric  CHEST/LUNG: Clear to percussion bilaterally; No rales, rhonchi, wheezing, or rubs  HEART: Regular rate and rhythm; No murmurs, rubs, or gallops  ABDOMEN: Soft, Nontender, Nondistended; Bowel sounds present  EXTREMITIES:  2+ Peripheral Pulses, No clubbing, cyanosis, or edema  LYMPH: No lymphadenopathy noted  SKIN: No rashes or lesions    Consultant(s) Notes Reviewed:  [x ] YES  [ ] NO  Care Discussed with Consultants/Other Providers [ x] YES  [ ] NO    LABS:                        14.0   9.4   )-----------( 227      ( 2018 08:11 )             44.5     07-    137  |  98  |  15  ----------------------------<  139<H>  3.9   |  22  |  0.77    Ca    9.5      2018 08:11    TPro  7.4  /  Alb  4.3  /  TBili  0.3  /  DBili  x   /  AST  26  /  ALT  37  /  AlkPhos  66  07-06    PT/INR - ( 2018 02:22 )   PT: 13.0 sec;   INR: 1.17          PTT - ( 2018 02:22 )  PTT:34.9 sec  Urinalysis Basic - ( 2018 07:20 )    Color: Yellow / Appearance: Clear / S.010 / pH: x  Gluc: x / Ketone: Trace mg/dL  / Bili: Negative / Urobili: 0.2 E.U./dL   Blood: x / Protein: Trace mg/dL / Nitrite: NEGATIVE   Leuk Esterase: NEGATIVE / RBC: < 5 /HPF / WBC < 5 /HPF   Sq Epi: x / Non Sq Epi: 0-5 /HPF / Bacteria: None /HPF    CAPILLARY BLOOD GLUCOSE    Urinalysis Basic - ( 2018 07:20 )    Color: Yellow / Appearance: Clear / S.010 / pH: x  Gluc: x / Ketone: Trace mg/dL  / Bili: Negative / Urobili: 0.2 E.U./dL   Blood: x / Protein: Trace mg/dL / Nitrite: NEGATIVE   Leuk Esterase: NEGATIVE / RBC: < 5 /HPF / WBC < 5 /HPF   Sq Epi: x / Non Sq Epi: 0-5 /HPF / Bacteria: None /HPF        RADIOLOGY & ADDITIONAL TESTS:    Imaging Personally Reviewed:  [ ] YES  [ ] NO JOSE LUIS CHRISTIANSON    Patient is a 89y old  Female who presents with a chief complaint of L FNF (2018 04:42)    PMHx      HOME MEDS        SURGICAL HISTORY       FAMILY HISTORY         SOCIAL HISTORY         T(C): 35.6 (18 @ 09:11), Max: 37.2 (18 @ 05:17)  HR: 70 (18 @ 09:11) (70 - 86)  BP: 98/59 (18 @ 09:11) (98/59 - 145/92)  RR: 22 (18 @ 09:11) (16 - 22)  SpO2: 94% (18 @ 09:11) (94% - 95%)  Wt(kg): --Vital Signs Last 24 Hrs  T(C): 35.6 (2018 09:11), Max: 37.2 (2018 05:17)  T(F): 96.1 (2018 09:11), Max: 99 (2018 05:17)  HR: 70 (2018 09:11) (70 - 86)  BP: 98/59 (2018 09:11) (98/59 - 145/92)  BP(mean): --  RR: 22 (2018 09:11) (16 - 22)  SpO2: 94% (2018 09:11) (94% - 95%)    PHYSICAL EXAM:  GENERAL: NAD, well-groomed, well-developed  HEAD:  Atraumatic, Normocephalic  EYES: EOMI, PERRLA, conjunctiva and sclera clear  ENMT: No tonsillar erythema, exudates, or enlargement; Moist mucous membranes, Good dentition, No lesions  NECK: Supple, No JVD, Normal thyroid  NERVOUS SYSTEM:  Alert & Oriented X3, Good concentration; Motor Strength 5/5 B/L upper and lower extremities; DTRs 2+ intact and symmetric  CHEST/LUNG: Clear to percussion bilaterally; No rales, rhonchi, wheezing, or rubs  HEART: Regular rate and rhythm; No murmurs, rubs, or gallops  ABDOMEN: Soft, Nontender, Nondistended; Bowel sounds present  EXTREMITIES:  2+ Peripheral Pulses, No clubbing, cyanosis, or edema  LYMPH: No lymphadenopathy noted  SKIN: No rashes or lesions    Consultant(s) Notes Reviewed:  [x ] YES  [ ] NO    LABS:                        14.0   9.4   )-----------( 227      ( 2018 08:11 )             44.5         137  |  98  |  15  ----------------------------<  139<H>  3.9   |  22  |  0.77    Ca    9.5      2018 08:11    TPro  7.4  /  Alb  4.3  /  TBili  0.3  /  DBili  x   /  AST  26  /  ALT  37  /  AlkPhos  66  07-06    PT/INR - ( 2018 02:22 )   PT: 13.0 sec;   INR: 1.17       PTT - ( 2018 02:22 )  PTT:34.9 sec  Urinalysis Basic - ( 2018 07:20 )    Color: Yellow / Appearance: Clear / S.010 / pH: x  Gluc: x / Ketone: Trace mg/dL  / Bili: Negative / Urobili: 0.2 E.U./dL   Blood: x / Protein: Trace mg/dL / Nitrite: NEGATIVE   Leuk Esterase: NEGATIVE / RBC: < 5 /HPF / WBC < 5 /HPF   Sq Epi: x / Non Sq Epi: 0-5 /HPF / Bacteria: None /HPF    Urinalysis Basic - ( 2018 07:20 )    Color: Yellow / Appearance: Clear / S.010 / pH: x  Gluc: x / Ketone: Trace mg/dL  / Bili: Negative / Urobili: 0.2 E.U./dL   Blood: x / Protein: Trace mg/dL / Nitrite: NEGATIVE   Leuk Esterase: NEGATIVE / RBC: < 5 /HPF / WBC < 5 /HPF   Sq Epi: x / Non Sq Epi: 0-5 /HPF / Bacteria: None /HPF JOSE LUIS CHRISTIANSON    Patient is a 89y old  Female who presents with a chief complaint of L FNF (2018 04:42)    Patient states she was at her baseline walking around home on her socks and she tripped, falling on her side, hitting her hear as well without LOC. She then got up, sat down and called her daughter. She denies any dizziness, lightheadedness, nausea, vomiting, chest pain or SOB. She is fairly active with the limitation of her vision, but she walks 15min every day twice a day (around 2 rounds to her block each time) and walks a flight of stairs daily to get her mail, all of this without experiencing CP, or SOB.     PMHx  - Hypertension  - Atrial fibrillation  - Hypothyroidism  - Glaucoma  - Retinitis pigmentosa    HOME MEDS  - Eliquis 2.5mg bid (last taken around 5pm on )  - Toprol 50mg qd  - Synthroid  - Eye drop for glaucoma    SURGICAL HISTORY   - Hysterectomy for ?fibroids when she was in her 30's  - Appendectomy in     FAMILY HISTORY   - Non contributory    SOCIAL HISTORY   - Former smoker of 1 pack of cigarettes/day since she was in her 20's until 10yrs ago.  - Drinks one glass of wine with dinner.  - No recreational drug use.     T(C): 35.6 (18 @ 09:11), Max: 37.2 (18 @ 05:17)  HR: 70 (18 @ 09:11) (70 - 86)  BP: 98/59 (18 @ 09:11) (98/59 - 145/92)  RR: 22 (18 @ 09:11) (16 - 22)  SpO2: 94% (18 @ 09:11) (94% - 95%)  Wt(kg): --Vital Signs Last 24 Hrs  T(C): 35.6 (2018 09:11), Max: 37.2 (2018 05:17)  T(F): 96.1 (2018 09:11), Max: 99 (2018 05:17)  HR: 70 (2018 09:11) (70 - 86)  BP: 98/59 (2018 09:11) (98/59 - 145/92)  BP(mean): --  RR: 22 (2018 09:11) (16 - 22)  SpO2: 94% (2018 09:11) (94% - 95%)    PHYSICAL EXAM:  GENERAL: NAD, well-groomed, well-developed  EYES: EOMI   ENMT: Moist mucous membranes   CHEST/LUNG: Clear to auscultation bilaterally   HEART: RRR. + soft systolic murmur heard throughout but louder in right upper sternal border   ABDOMEN: normoactive BS, abdomen soft, NTND  EXTREMITIES:  2+ Peripheral Pulses. No edema   NERVOUS SYSTEM:  Alert & Oriented X3, Good concentration; Motor Strength 5/5 B/L upper and lower extremities; DTRs 2+ intact and symmetric    Consultant(s) Notes Reviewed:  [x ] YES  [ ] NO    LABS:                        14.0   9.4   )-----------( 227      ( 2018 08:11 )             44.5     07-    137  |  98  |  15  ----------------------------<  139<H>  3.9   |  22  |  0.77    Ca    9.5      2018 08:11    TPro  7.4  /  Alb  4.3  /  TBili  0.3  /  DBili  x   /  AST  26  /  ALT  37  /  AlkPhos  66  07-06    PT/INR - ( 2018 02:22 )   PT: 13.0 sec;   INR: 1.17       PTT - ( 2018 02:22 )  PTT:34.9 sec  Urinalysis Basic - ( 2018 07:20 )    Color: Yellow / Appearance: Clear / S.010 / pH: x  Gluc: x / Ketone: Trace mg/dL  / Bili: Negative / Urobili: 0.2 E.U./dL   Blood: x / Protein: Trace mg/dL / Nitrite: NEGATIVE   Leuk Esterase: NEGATIVE / RBC: < 5 /HPF / WBC < 5 /HPF   Sq Epi: x / Non Sq Epi: 0-5 /HPF / Bacteria: None /HPF    Urinalysis Basic - ( 2018 07:20 )    Color: Yellow / Appearance: Clear / S.010 / pH: x  Gluc: x / Ketone: Trace mg/dL  / Bili: Negative / Urobili: 0.2 E.U./dL   Blood: x / Protein: Trace mg/dL / Nitrite: NEGATIVE   Leuk Esterase: NEGATIVE / RBC: < 5 /HPF / WBC < 5 /HPF   Sq Epi: x / Non Sq Epi: 0-5 /HPF / Bacteria: None /HPF JOSE LUIS CHRISTIANSON    Patient is a 89y old  Female who presents with a chief complaint of L FNF (2018 04:42)    Patient states she was at her baseline walking around home on her socks and she tripped, falling on her side, hitting her hear as well without LOC. She then got up, sat down and called her daughter. She denies any dizziness, lightheadedness, nausea, vomiting, chest pain or SOB. She is fairly active with the limitation of her vision, but she walks 15min every day twice a day (around 2 rounds to her block each time) and walks a flight of stairs daily to get her mail, all of this without experiencing CP, or SOB.     PMHx  - Hypertension  - Atrial fibrillation  - Hypothyroidism  - Glaucoma  - Retinitis pigmentosa    HOME MEDS  - Eliquis 2.5mg bid (last taken around 5pm on )  - Toprol 50mg qd  - Synthroid  - Eye drop for glaucoma    SURGICAL HISTORY   - Hysterectomy for ?fibroids when she was in her 30's  - Appendectomy in     FAMILY HISTORY   - Non contributory    SOCIAL HISTORY   - Former smoker of 1 pack of cigarettes/day since she was in her 20's until 10yrs ago.  - Drinks one glass of wine with dinner.  - No recreational drug use.     T(C): 35.6 (18 @ 09:11), Max: 37.2 (18 @ 05:17)  HR: 70 (18 @ 09:11) (70 - 86)  BP: 98/59 (18 @ 09:11) (98/59 - 145/92)  RR: 22 (18 @ 09:11) (16 - 22)  SpO2: 94% (18 @ 09:11) (94% - 95%)  Wt(kg): --Vital Signs Last 24 Hrs  T(C): 35.6 (2018 09:11), Max: 37.2 (2018 05:17)  T(F): 96.1 (2018 09:11), Max: 99 (2018 05:17)  HR: 70 (2018 09:11) (70 - 86)  BP: 98/59 (2018 09:11) (98/59 - 145/92)  BP(mean): --  RR: 22 (2018 09:11) (16 - 22)  SpO2: 94% (2018 09:11) (94% - 95%)    PHYSICAL EXAM:  GENERAL: NAD, well-groomed, well-developed  EYES: EOMI   ENMT: Moist mucous membranes   CHEST/LUNG: Clear to auscultation bilaterally   HEART: RRR. + soft systolic murmur heard throughout but louder in right upper sternal border   ABDOMEN: normoactive BS, abdomen soft, NTND  EXTREMITIES:  2+ Peripheral Pulses. No edema   NERVOUS SYSTEM:  Alert & Oriented X3, Good concentration     Consultant(s) Notes Reviewed:  [x ] YES  [ ] NO    LABS:                        14.0   9.4   )-----------( 227      ( 2018 08:11 )             44.5     07-    137  |  98  |  15  ----------------------------<  139<H>  3.9   |  22  |  0.77    Ca    9.5      2018 08:11    TPro  7.4  /  Alb  4.3  /  TBili  0.3  /  DBili  x   /  AST  26  /  ALT  37  /  AlkPhos  66  07-06    PT/INR - ( 2018 02:22 )   PT: 13.0 sec;   INR: 1.17       PTT - ( 2018 02:22 )  PTT:34.9 sec  Urinalysis Basic - ( 2018 07:20 )    Color: Yellow / Appearance: Clear / S.010 / pH: x  Gluc: x / Ketone: Trace mg/dL  / Bili: Negative / Urobili: 0.2 E.U./dL   Blood: x / Protein: Trace mg/dL / Nitrite: NEGATIVE   Leuk Esterase: NEGATIVE / RBC: < 5 /HPF / WBC < 5 /HPF   Sq Epi: x / Non Sq Epi: 0-5 /HPF / Bacteria: None /HPF    Urinalysis Basic - ( 2018 07:20 )    Color: Yellow / Appearance: Clear / S.010 / pH: x  Gluc: x / Ketone: Trace mg/dL  / Bili: Negative / Urobili: 0.2 E.U./dL   Blood: x / Protein: Trace mg/dL / Nitrite: NEGATIVE   Leuk Esterase: NEGATIVE / RBC: < 5 /HPF / WBC < 5 /HPF   Sq Epi: x / Non Sq Epi: 0-5 /HPF / Bacteria: None /HPF

## 2018-07-07 NOTE — PROGRESS NOTE ADULT - SUBJECTIVE AND OBJECTIVE BOX
Patient seen and examined at bedside. LFNF, preop    No acute events.  Pain tolerable.     Denies chest pain/SOB.  No headache.  Tolerating PO.    T(C): 37.2 (07-07-18 @ 05:17), Max: 37.2 (07-07-18 @ 05:17)  T(F): 99 (07-07-18 @ 05:17), Max: 99 (07-07-18 @ 05:17)  HR:  (76 - 86)  BP:  (142/86 - 145/92)  RR:  (16 - 17)  SpO2:  (94% - 97%)  Wt(kg): --    NAD, non labored respirations.  Abd soft, NTND.  NVID   wwp and SILT                              13.6   10.1  )-------------------( 224      ( 07-06 @ 02:20 )                 42.1       I&O's Detail    06 Jul 2018 07:01  -  07 Jul 2018 07:00  --------------------------------------------------------  IN:    Oral Fluid: 220 mL  Total IN: 220 mL    OUT:    Indwelling Catheter - Urethral: 1550 mL  Total OUT: 1550 mL    Total NET: -1330 mL    A&P:    89 F with L FNF, pre op for L hip Mohsen with Dr. Barlow on 7/8/18  NPO p MN  Hold DVT prophylaxis on AM of surgery  Pain control

## 2018-07-07 NOTE — CONSULT NOTE ADULT - ASSESSMENT
88yo F with PMHx of hypertension, atrial fibrillation on eliquis, hypothyroidism, glaucoma, retinitis pigmentosa who 89yo F with PMHx of hypertension, atrial fibrillation on eliquis, hypothyroidism, glaucoma, retinitis pigmentosa who presents with L femoral neck fracture. Medicine consulted for pre-op assessment.     # L kim-arthroplastry.   - Patient's RCRI is 0 which puts her at 0.4% risk of major cardiac event.  - Patient METS are between 4 and 10.  - She is a low risk patient going for a intermediate risk procedure.  - Eliquis held since since 7/5 at 5pm.   - Please continue beta blocker perioperatively.   *** Patient medically optimized for scheduled procedure.    # Hypothyroidism.   - Continue home synthroid.     # Atrial fibrillation.   - Continue regimen as per cards recommendations.     INCOMPLETE 89yo F with PMHx of hypertension, atrial fibrillation on eliquis, hypothyroidism, glaucoma, retinitis pigmentosa who presents with L femoral neck fracture. Medicine consulted for pre-op assessment.     # L kim-arthroplastry.   - Patient's RCRI is 0 which puts her at 0.4% risk of major cardiac event.  - Patient METS are between 4 and 10.  - She is a low risk patient going for an intermediate risk procedure.  - Eliquis held since since 7/5 at 5pm.   - Please continue beta blocker perioperatively.   *** Patient medically optimized for scheduled procedure.    # Hypothyroidism.   - Continue home synthroid.     # Atrial fibrillation.   - Continue regimen as per cards recommendations.   - Patient will need telemetry postop given afib and risk for RVR.

## 2018-07-07 NOTE — PROGRESS NOTE ADULT - SUBJECTIVE AND OBJECTIVE BOX
Ortho Preop Note    Patient is a 89y old  Female who presents with a chief complaint of L FNF (2018 04:42)    Diagnosis: L femoral neck fx  Procedure: L hip kim  Surgeon: Cain Sewell0   9.4   )-----------( 227      ( 2018 08:11 )             44.5         137  |  98  |  15  ----------------------------<  139<H>  3.9   |  22  |  0.77    Ca    9.5      2018 08:11    TPro  7.4  /  Alb  4.3  /  TBili  0.3  /  DBili  x   /  AST  26  /  ALT  37  /  AlkPhos  66  07-06    PT/INR - ( 2018 02:22 )   PT: 13.0 sec;   INR: 1.17          PTT - ( 2018 02:22 )  PTT:34.9 sec  Urinalysis Basic - ( 2018 07:20 )    Color: Yellow / Appearance: Clear / S.010 / pH: x  Gluc: x / Ketone: Trace mg/dL  / Bili: Negative / Urobili: 0.2 E.U./dL   Blood: x / Protein: Trace mg/dL / Nitrite: NEGATIVE   Leuk Esterase: NEGATIVE / RBC: < 5 /HPF / WBC < 5 /HPF   Sq Epi: x / Non Sq Epi: 0-5 /HPF / Bacteria: None /HPF        [x ] Type & Screen  [x ] CBC  [x ] BMP  [x ] PT/PTT/INR  [x ] Urinalysis  [ x] Chest X-ray  x[ ] EKG  [x ] NPO/IVF  [x ] Consent  [ x] Clearance  [ x] Added on to OR Schedule  [ x] Anti-coagulation held  [x ] MRSA/MSSA Nasal Screen     Assessment & Plan:  89yFemale with L hip fracture  -For OR )    Ortho Pager [phone]

## 2018-07-08 ENCOUNTER — RESULT REVIEW (OUTPATIENT)
Age: 83
End: 2018-07-08

## 2018-07-08 LAB
24R-OH-CALCIDIOL SERPL-MCNC: 23.6 NG/ML — LOW (ref 30–80)
ANION GAP SERPL CALC-SCNC: 13 MMOL/L — SIGNIFICANT CHANGE UP (ref 5–17)
APTT BLD: 31.2 SEC — SIGNIFICANT CHANGE UP (ref 27.5–37.4)
BASOPHILS NFR BLD AUTO: 0.2 % — SIGNIFICANT CHANGE UP (ref 0–2)
BUN SERPL-MCNC: 20 MG/DL — SIGNIFICANT CHANGE UP (ref 7–23)
CALCIUM SERPL-MCNC: 9.7 MG/DL — SIGNIFICANT CHANGE UP (ref 8.4–10.5)
CHLORIDE SERPL-SCNC: 100 MMOL/L — SIGNIFICANT CHANGE UP (ref 96–108)
CO2 SERPL-SCNC: 23 MMOL/L — SIGNIFICANT CHANGE UP (ref 22–31)
CREAT SERPL-MCNC: 0.87 MG/DL — SIGNIFICANT CHANGE UP (ref 0.5–1.3)
EOSINOPHIL NFR BLD AUTO: 1.7 % — SIGNIFICANT CHANGE UP (ref 0–6)
GLUCOSE SERPL-MCNC: 107 MG/DL — HIGH (ref 70–99)
HCT VFR BLD CALC: 39.4 % — SIGNIFICANT CHANGE UP (ref 34.5–45)
HCT VFR BLD CALC: 42.8 % — SIGNIFICANT CHANGE UP (ref 34.5–45)
HGB BLD-MCNC: 12.7 G/DL — SIGNIFICANT CHANGE UP (ref 11.5–15.5)
HGB BLD-MCNC: 13.8 G/DL — SIGNIFICANT CHANGE UP (ref 11.5–15.5)
INR BLD: 1.1 — SIGNIFICANT CHANGE UP (ref 0.88–1.16)
LYMPHOCYTES # BLD AUTO: 17.6 % — SIGNIFICANT CHANGE UP (ref 13–44)
MCHC RBC-ENTMCNC: 29.1 PG — SIGNIFICANT CHANGE UP (ref 27–34)
MCHC RBC-ENTMCNC: 29.4 PG — SIGNIFICANT CHANGE UP (ref 27–34)
MCHC RBC-ENTMCNC: 32.2 G/DL — SIGNIFICANT CHANGE UP (ref 32–36)
MCHC RBC-ENTMCNC: 32.2 G/DL — SIGNIFICANT CHANGE UP (ref 32–36)
MCV RBC AUTO: 90.2 FL — SIGNIFICANT CHANGE UP (ref 80–100)
MCV RBC AUTO: 91.3 FL — SIGNIFICANT CHANGE UP (ref 80–100)
MONOCYTES NFR BLD AUTO: 8.6 % — SIGNIFICANT CHANGE UP (ref 2–14)
NEUTROPHILS NFR BLD AUTO: 71.9 % — SIGNIFICANT CHANGE UP (ref 43–77)
PLATELET # BLD AUTO: 232 K/UL — SIGNIFICANT CHANGE UP (ref 150–400)
PLATELET # BLD AUTO: 233 K/UL — SIGNIFICANT CHANGE UP (ref 150–400)
POTASSIUM SERPL-MCNC: 4 MMOL/L — SIGNIFICANT CHANGE UP (ref 3.5–5.3)
POTASSIUM SERPL-SCNC: 4 MMOL/L — SIGNIFICANT CHANGE UP (ref 3.5–5.3)
PROTHROM AB SERPL-ACNC: 12.2 SEC — SIGNIFICANT CHANGE UP (ref 9.8–12.7)
RBC # BLD: 4.37 M/UL — SIGNIFICANT CHANGE UP (ref 3.8–5.2)
RBC # BLD: 4.69 M/UL — SIGNIFICANT CHANGE UP (ref 3.8–5.2)
RBC # FLD: 13.6 % — SIGNIFICANT CHANGE UP (ref 10.3–16.9)
RBC # FLD: 14 % — SIGNIFICANT CHANGE UP (ref 10.3–16.9)
RH IG SCN BLD-IMP: POSITIVE — SIGNIFICANT CHANGE UP
SODIUM SERPL-SCNC: 136 MMOL/L — SIGNIFICANT CHANGE UP (ref 135–145)
VIT D25+D1,25 OH+D1,25 PNL SERPL-MCNC: 93 PG/ML — HIGH (ref 19.9–79.3)
WBC # BLD: 12.8 K/UL — HIGH (ref 3.8–10.5)
WBC # BLD: 7.9 K/UL — SIGNIFICANT CHANGE UP (ref 3.8–10.5)
WBC # FLD AUTO: 12.8 K/UL — HIGH (ref 3.8–10.5)
WBC # FLD AUTO: 7.9 K/UL — SIGNIFICANT CHANGE UP (ref 3.8–10.5)

## 2018-07-08 PROCEDURE — 72170 X-RAY EXAM OF PELVIS: CPT | Mod: 26,76

## 2018-07-08 RX ORDER — SODIUM CHLORIDE 9 MG/ML
1000 INJECTION, SOLUTION INTRAVENOUS
Qty: 0 | Refills: 0 | Status: DISCONTINUED | OUTPATIENT
Start: 2018-07-08 | End: 2018-07-09

## 2018-07-08 RX ORDER — CEFAZOLIN SODIUM 1 G
1500 VIAL (EA) INJECTION EVERY 8 HOURS
Qty: 0 | Refills: 0 | Status: COMPLETED | OUTPATIENT
Start: 2018-07-08 | End: 2018-07-09

## 2018-07-08 RX ORDER — BUPIVACAINE 13.3 MG/ML
20 INJECTION, SUSPENSION, LIPOSOMAL INFILTRATION ONCE
Qty: 0 | Refills: 0 | Status: DISCONTINUED | OUTPATIENT
Start: 2018-07-08 | End: 2018-07-09

## 2018-07-08 RX ORDER — ONDANSETRON 8 MG/1
4 TABLET, FILM COATED ORAL EVERY 6 HOURS
Qty: 0 | Refills: 0 | Status: DISCONTINUED | OUTPATIENT
Start: 2018-07-08 | End: 2018-07-12

## 2018-07-08 RX ORDER — DOCUSATE SODIUM 100 MG
100 CAPSULE ORAL THREE TIMES A DAY
Qty: 0 | Refills: 0 | Status: DISCONTINUED | OUTPATIENT
Start: 2018-07-08 | End: 2018-07-12

## 2018-07-08 RX ORDER — APIXABAN 2.5 MG/1
2.5 TABLET, FILM COATED ORAL EVERY 12 HOURS
Qty: 0 | Refills: 0 | Status: DISCONTINUED | OUTPATIENT
Start: 2018-07-09 | End: 2018-07-12

## 2018-07-08 RX ORDER — SENNA PLUS 8.6 MG/1
2 TABLET ORAL AT BEDTIME
Qty: 0 | Refills: 0 | Status: DISCONTINUED | OUTPATIENT
Start: 2018-07-08 | End: 2018-07-12

## 2018-07-08 RX ORDER — POLYETHYLENE GLYCOL 3350 17 G/17G
17 POWDER, FOR SOLUTION ORAL DAILY
Qty: 0 | Refills: 0 | Status: DISCONTINUED | OUTPATIENT
Start: 2018-07-08 | End: 2018-07-12

## 2018-07-08 RX ADMIN — ACETAZOLAMIDE 250 MILLIGRAM(S): 250 TABLET ORAL at 18:33

## 2018-07-08 RX ADMIN — MORPHINE SULFATE 2 MILLIGRAM(S): 50 CAPSULE, EXTENDED RELEASE ORAL at 12:38

## 2018-07-08 RX ADMIN — Medication 650 MILLIGRAM(S): at 06:08

## 2018-07-08 RX ADMIN — SODIUM CHLORIDE 80 MILLILITER(S): 9 INJECTION, SOLUTION INTRAVENOUS at 22:43

## 2018-07-08 RX ADMIN — ACETAZOLAMIDE 250 MILLIGRAM(S): 250 TABLET ORAL at 05:21

## 2018-07-08 RX ADMIN — Medication 50 MILLIGRAM(S): at 05:21

## 2018-07-08 RX ADMIN — Medication 50 MILLIGRAM(S): at 18:33

## 2018-07-08 RX ADMIN — Medication 650 MILLIGRAM(S): at 05:21

## 2018-07-08 RX ADMIN — MORPHINE SULFATE 2 MILLIGRAM(S): 50 CAPSULE, EXTENDED RELEASE ORAL at 22:39

## 2018-07-08 RX ADMIN — Medication 650 MILLIGRAM(S): at 06:15

## 2018-07-08 RX ADMIN — Medication 100 MILLIGRAM(S): at 22:38

## 2018-07-08 RX ADMIN — Medication 100 MILLIGRAM(S): at 22:40

## 2018-07-08 RX ADMIN — Medication 30 MILLIGRAM(S): at 05:21

## 2018-07-08 RX ADMIN — SENNA PLUS 2 TABLET(S): 8.6 TABLET ORAL at 22:39

## 2018-07-08 NOTE — PROGRESS NOTE ADULT - SUBJECTIVE AND OBJECTIVE BOX
S: No acute events overnight.    Vital Signs Last 24 Hrs  T(C): 36.7 (08 Jul 2018 04:53), Max: 36.7 (08 Jul 2018 04:53)  T(F): 98.1 (08 Jul 2018 04:53), Max: 98.1 (08 Jul 2018 04:53)  HR: 82 (08 Jul 2018 04:53) (70 - 82)  BP: 103/72 (08 Jul 2018 04:53) (98/59 - 136/81)  BP(mean): --  RR: 20 (08 Jul 2018 04:53) (18 - 22)  SpO2: 96% (08 Jul 2018 04:53) (94% - 97%)    I&O's Summary    06 Jul 2018 07:01  -  07 Jul 2018 07:00  --------------------------------------------------------  IN: 220 mL / OUT: 1550 mL / NET: -1330 mL    07 Jul 2018 07:01  -  08 Jul 2018 06:26  --------------------------------------------------------  IN: 0 mL / OUT: 250 mL / NET: -250 mL        L Hip  Motor: 5/5 GS/TA/EHL/FHL    Sensation: SILT sural, saph, DP, SP and tibial n distribution  Pulses: WWP, DP/PT 2+                            14.0   9.4   )-----------( 227      ( 07 Jul 2018 08:11 )             44.5     07-07    137  |  98  |  15  ----------------------------<  139<H>  3.9   |  22  |  0.77    Ca    9.5      07 Jul 2018 08:11        MEDICATIONS  (STANDING):  acetazolamide    Tablet 250 milliGRAM(s) Oral two times a day  bimatoprost 0.01% Ophthalmic Solution 1 Drop(s) Both EYES at bedtime  docusate sodium 100 milliGRAM(s) Oral three times a day  ergocalciferol 95936 Unit(s) Oral every week  heparin  Injectable 5000 Unit(s) SubCutaneous every 8 hours  lactated ringers. 1000 milliLiter(s) (80 mL/Hr) IV Continuous <Continuous>  latanoprost 0.005% Ophthalmic Solution 1 Drop(s) Both EYES at bedtime  metoprolol succinate ER 50 milliGRAM(s) Oral two times a day  multivitamin 1 Tablet(s) Oral daily  polyethylene glycol 3350 17 Gram(s) Oral daily  senna 2 Tablet(s) Oral at bedtime  thyroid 30 milliGRAM(s) Oral daily    MEDICATIONS  (PRN):  acetaminophen   Tablet 650 milliGRAM(s) Oral every 6 hours PRN For Temp greater than 38 C (100.4 F)  acetaminophen   Tablet. 650 milliGRAM(s) Oral every 6 hours PRN Mild Pain (1 - 3)  metoclopramide Injectable 10 milliGRAM(s) IV Push every 6 hours PRN Nausea and/or Vomiting  morphine  - Injectable 2 milliGRAM(s) IV Push every 4 hours PRN breakthrough pain  traMADol 25 milliGRAM(s) Oral every 4 hours PRN Moderate Pain (4 - 6)  traMADol 50 milliGRAM(s) Oral every 4 hours PRN Severe Pain (7 - 10)      A/P:  89y Female planned for L hip kim today  -Stable  -Pain Control  -PT/WBAT  -DVT ppx: Eliquis (on hold for now)  -NPO  -f/u AM labs   -Dispo: Pending

## 2018-07-08 NOTE — PROGRESS NOTE ADULT - SUBJECTIVE AND OBJECTIVE BOX
Ortho Post Op Check    Procedure: L Hip Mohsen  Surgeon: Cain  Laterality: L    Pt comfortable without complaints, pain controlled  Denies CP, SOB, N/V, numbness/tingling     Vital Signs Last 24 Hrs  T(C): 36.4 (07-08-18 @ 13:30), Max: 36.7 (07-08-18 @ 09:17)  T(F): 97.6 (07-08-18 @ 13:30), Max: 98 (07-08-18 @ 09:17)  HR: 60 (07-08-18 @ 14:00) (60 - 76)  BP: 109/65 (07-08-18 @ 14:00) (97/57 - 136/69)  BP(mean): 84 (07-08-18 @ 14:00) (76 - 102)  RR: 16 (07-08-18 @ 14:00) (15 - 25)  SpO2: 98% (07-08-18 @ 14:00) (90% - 98%)  AVSS    General: Pt Alert and oriented, NAD  DSG C/D/I  Pulses:  Sensation: SILT   Motor: EHL/FHL/TA/GS                            12.7   12.8  )-----------( 233      ( 08 Jul 2018 12:16 )             39.4   08 Jul 2018 06:59    136    |  100    |  20     ----------------------------<  107    4.0     |  23     |  0.87           Post-op X-Ray: Implant in good position    A/P: 89yFemale POD#0 s/p L Hip Mohsen  - Stable  - Pain Control  - DVT ppx: Apixaban  - Post op abx: Ancef  - PT, WBS: WBAT    Ortho Pager [phone]

## 2018-07-09 ENCOUNTER — TRANSCRIPTION ENCOUNTER (OUTPATIENT)
Age: 83
End: 2018-07-09

## 2018-07-09 LAB
ANION GAP SERPL CALC-SCNC: 13 MMOL/L — SIGNIFICANT CHANGE UP (ref 5–17)
BUN SERPL-MCNC: 25 MG/DL — HIGH (ref 7–23)
CALCIUM SERPL-MCNC: 8.7 MG/DL — SIGNIFICANT CHANGE UP (ref 8.4–10.5)
CHLORIDE SERPL-SCNC: 99 MMOL/L — SIGNIFICANT CHANGE UP (ref 96–108)
CO2 SERPL-SCNC: 21 MMOL/L — LOW (ref 22–31)
CREAT SERPL-MCNC: 0.72 MG/DL — SIGNIFICANT CHANGE UP (ref 0.5–1.3)
GLUCOSE SERPL-MCNC: 107 MG/DL — HIGH (ref 70–99)
HCT VFR BLD CALC: 34.7 % — SIGNIFICANT CHANGE UP (ref 34.5–45)
HGB BLD-MCNC: 11.1 G/DL — LOW (ref 11.5–15.5)
MAGNESIUM SERPL-MCNC: 2.1 MG/DL — SIGNIFICANT CHANGE UP (ref 1.6–2.6)
MCHC RBC-ENTMCNC: 29.3 PG — SIGNIFICANT CHANGE UP (ref 27–34)
MCHC RBC-ENTMCNC: 32 G/DL — SIGNIFICANT CHANGE UP (ref 32–36)
MCV RBC AUTO: 91.6 FL — SIGNIFICANT CHANGE UP (ref 80–100)
PHOSPHATE SERPL-MCNC: 4.2 MG/DL — SIGNIFICANT CHANGE UP (ref 2.5–4.5)
PLATELET # BLD AUTO: 216 K/UL — SIGNIFICANT CHANGE UP (ref 150–400)
POTASSIUM SERPL-MCNC: 3.7 MMOL/L — SIGNIFICANT CHANGE UP (ref 3.5–5.3)
POTASSIUM SERPL-SCNC: 3.7 MMOL/L — SIGNIFICANT CHANGE UP (ref 3.5–5.3)
RBC # BLD: 3.79 M/UL — LOW (ref 3.8–5.2)
RBC # FLD: 13.8 % — SIGNIFICANT CHANGE UP (ref 10.3–16.9)
SODIUM SERPL-SCNC: 133 MMOL/L — LOW (ref 135–145)
WBC # BLD: 9.1 K/UL — SIGNIFICANT CHANGE UP (ref 3.8–10.5)
WBC # FLD AUTO: 9.1 K/UL — SIGNIFICANT CHANGE UP (ref 3.8–10.5)

## 2018-07-09 PROCEDURE — 99233 SBSQ HOSP IP/OBS HIGH 50: CPT | Mod: GC

## 2018-07-09 PROCEDURE — 99232 SBSQ HOSP IP/OBS MODERATE 35: CPT

## 2018-07-09 RX ORDER — POLYETHYLENE GLYCOL 3350 17 G/17G
17 POWDER, FOR SOLUTION ORAL
Qty: 0 | Refills: 0 | COMMUNITY
Start: 2018-07-09

## 2018-07-09 RX ORDER — ACETAMINOPHEN 500 MG
975 TABLET ORAL EVERY 8 HOURS
Qty: 0 | Refills: 0 | Status: DISCONTINUED | OUTPATIENT
Start: 2018-07-09 | End: 2018-07-12

## 2018-07-09 RX ORDER — SENNA PLUS 8.6 MG/1
2 TABLET ORAL
Qty: 0 | Refills: 0 | COMMUNITY
Start: 2018-07-09

## 2018-07-09 RX ORDER — DOCUSATE SODIUM 100 MG
1 CAPSULE ORAL
Qty: 0 | Refills: 0 | COMMUNITY
Start: 2018-07-09

## 2018-07-09 RX ORDER — TRAMADOL HYDROCHLORIDE 50 MG/1
0.5 TABLET ORAL
Qty: 0 | Refills: 0 | COMMUNITY
Start: 2018-07-09

## 2018-07-09 RX ORDER — ACETAMINOPHEN 500 MG
2 TABLET ORAL
Qty: 0 | Refills: 0 | COMMUNITY
Start: 2018-07-09

## 2018-07-09 RX ORDER — TRAMADOL HYDROCHLORIDE 50 MG/1
1 TABLET ORAL
Qty: 0 | Refills: 0 | COMMUNITY
Start: 2018-07-09

## 2018-07-09 RX ORDER — CHOLECALCIFEROL (VITAMIN D3) 125 MCG
1000 CAPSULE ORAL DAILY
Qty: 0 | Refills: 0 | Status: DISCONTINUED | OUTPATIENT
Start: 2018-07-09 | End: 2018-07-12

## 2018-07-09 RX ADMIN — APIXABAN 2.5 MILLIGRAM(S): 2.5 TABLET, FILM COATED ORAL at 05:59

## 2018-07-09 RX ADMIN — Medication 100 MILLIGRAM(S): at 06:23

## 2018-07-09 RX ADMIN — Medication 100 MILLIGRAM(S): at 13:44

## 2018-07-09 RX ADMIN — POLYETHYLENE GLYCOL 3350 17 GRAM(S): 17 POWDER, FOR SOLUTION ORAL at 13:44

## 2018-07-09 RX ADMIN — Medication 100 MILLIGRAM(S): at 06:25

## 2018-07-09 RX ADMIN — ACETAZOLAMIDE 250 MILLIGRAM(S): 250 TABLET ORAL at 06:00

## 2018-07-09 RX ADMIN — Medication 50 MILLIGRAM(S): at 17:07

## 2018-07-09 RX ADMIN — Medication 100 MILLIGRAM(S): at 14:08

## 2018-07-09 RX ADMIN — Medication 975 MILLIGRAM(S): at 16:03

## 2018-07-09 RX ADMIN — Medication 30 MILLIGRAM(S): at 06:25

## 2018-07-09 RX ADMIN — Medication 975 MILLIGRAM(S): at 17:00

## 2018-07-09 RX ADMIN — APIXABAN 2.5 MILLIGRAM(S): 2.5 TABLET, FILM COATED ORAL at 17:07

## 2018-07-09 RX ADMIN — Medication 975 MILLIGRAM(S): at 08:41

## 2018-07-09 RX ADMIN — Medication 1 TABLET(S): at 13:44

## 2018-07-09 RX ADMIN — ACETAZOLAMIDE 250 MILLIGRAM(S): 250 TABLET ORAL at 17:07

## 2018-07-09 RX ADMIN — Medication 50 MILLIGRAM(S): at 05:59

## 2018-07-09 RX ADMIN — BIMATOPROST 1 DROP(S): 0.3 SOLUTION/ DROPS OPHTHALMIC at 22:38

## 2018-07-09 RX ADMIN — Medication 100 MILLIGRAM(S): at 05:59

## 2018-07-09 RX ADMIN — Medication 975 MILLIGRAM(S): at 10:00

## 2018-07-09 RX ADMIN — MORPHINE SULFATE 2 MILLIGRAM(S): 50 CAPSULE, EXTENDED RELEASE ORAL at 00:54

## 2018-07-09 NOTE — DISCHARGE NOTE ADULT - HOSPITAL COURSE
Admit 7/6/18 L FNF  OR 7/6/18- L hip hemiarthoplasty posterior approach  Periop Antibx  DVT ppx  PT   Pain mgt  legally blind-- continue home opthalmic agents, safety precautions  med c/s- hypothyroidism atrial fibrillation

## 2018-07-09 NOTE — DISCHARGE NOTE ADULT - CARE PLAN
Principal Discharge DX:	Hip fracture  Goal:	Improved ambulation and decreased pain after left hip intramedullary nailing on 7/8/18  Assessment and plan of treatment:	Weight bearing as tolerated on left leg with assistance.  Patient is 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 and will require assistance for ADLs  Posterior hip precautions-- keep abduction pillow implemented  No strenuous activity, heavy lifting, driving, tub bathing, or returning to work until cleared by MD.  You may shower--dressing is waterproof.  Remove dressing after post op day 7, then leave incision open to air.  Follow up with Dr. Barlow to schedule an appt within 10-14 days.  If you don't have a bowel movement by post op day 3, then take Milk of Magnesia (over the counter).  If no bowel movement by at least post op day 5, then use a Dulcolax suppository (over the counter) and/or a Fleets enema--if still no bowel movement, call your MD.  Contact your doctor if you experience: fever greater than 101.5, chills, chest pain, difficulty breathing, bleeding, redness or heat around the incision. Principal Discharge DX:	Hip fracture  Goal:	Improved ambulation and decreased pain after left hip hemiarthroplasy (posterior approach) on 7/8/18  Assessment and plan of treatment:	Weight bearing as tolerated on left leg with assistance.  Patient is 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 and will require assistance for ADLs  Posterior hip precautions-- keep abduction pillow implemented  No strenuous activity, heavy lifting, driving, tub bathing, or returning to work until cleared by MD.  You may shower--dressing is waterproof.  Remove dressing after post op day 7, then leave incision open to air.  Follow up with Dr. Barlow to schedule an appt within 10-14 days.  If you don't have a bowel movement by post op day 3, then take Milk of Magnesia (over the counter).  If no bowel movement by at least post op day 5, then use a Dulcolax suppository (over the counter) and/or a Fleets enema--if still no bowel movement, call your MD.  Contact your doctor if you experience: fever greater than 101.5, chills, chest pain, difficulty breathing, bleeding, redness or heat around the incision. Principal Discharge DX:	Hip fracture  Goal:	Improved ambulation and decreased pain after left hip hemiarthroplasty (posterior approach) on 7/8/18  Assessment and plan of treatment:	Weight bearing as tolerated on left leg with assistance.  Patient is 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 and will require assistance for ADLs  Posterior hip precautions-- keep abduction pillow implemented  No strenuous activity, heavy lifting, driving, tub bathing, or returning to work until cleared by MD.  You may shower--dressing is waterproof.  Remove dressing after post op day 7, then leave incision open to air.  Follow up with Dr. Barlow to schedule an appt within 10-14 days.  If you don't have a bowel movement by post op day 3, then take Milk of Magnesia (over the counter).  If no bowel movement by at least post op day 5, then use a Dulcolax suppository (over the counter) and/or a Fleets enema--if still no bowel movement, call your MD.  Contact your doctor if you experience: fever greater than 101.5, chills, chest pain, difficulty breathing, bleeding, redness or heat around the incision.  continue home medication for chronic atrial fibrillation

## 2018-07-09 NOTE — PHYSICAL THERAPY INITIAL EVALUATION ADULT - CRITERIA FOR SKILLED THERAPEUTIC INTERVENTIONS
therapy frequency/anticipated discharge recommendation/rehab potential/impairments found/functional limitations in following categories

## 2018-07-09 NOTE — DISCHARGE NOTE ADULT - MEDICATION SUMMARY - MEDICATIONS TO TAKE
I will START or STAY ON the medications listed below when I get home from the hospital:    acetaminophen 325 mg oral tablet  -- 2 tab(s) by mouth every 6 hours, As needed, mild pain (1-3) or For Temp greater than 38 C (100.4 F)  -- Indication: For mild pain, fever    traMADol 50 mg oral tablet  -- 0.5 tab(s) by mouth every 4 hours, As needed, Moderate Pain (4 - 6)  -- Indication: For moderate pain    traMADol 50 mg oral tablet  -- 1 tab(s) by mouth every 4 hours, As needed, Severe Pain (7 - 10)  -- Indication: For severe pain    Eliquis 2.5 mg oral tablet  -- 1 tab(s) by mouth 2 times a day  -- Indication: For dvt ppx/ atrial fibrillation    Metoprolol Succinate ER 50 mg oral tablet, extended release  -- 1 tab(s) by mouth 2 times a day  -- Indication: For rate control hx of atrial fibrillation    acetaZOLAMIDE 250 mg oral tablet  -- 1 tab(s) by mouth 2 times a day  -- Indication: For Home diuretic    bisacodyl 10 mg rectal suppository  -- 1 suppository(ies) rectally once a day, As needed, If no bowel movement by POD#2  -- Indication: For Constipation    docusate sodium 100 mg oral capsule  -- 1 cap(s) by mouth 3 times a day  -- Indication: For Constipation    polyethylene glycol 3350 oral powder for reconstitution  -- 17 gram(s) by mouth once a day  -- Indication: For Constipation    senna oral tablet  -- 2 tab(s) by mouth once a day (at bedtime), As needed, Constipation  -- Indication: For Constipation    Lumigan 0.01% ophthalmic solution  -- 1 drop(s) to each affected eye once a day (in the evening)  -- Indication: For Home opthalmic preparation    [place] Thyroid 30 mg oral tablet  -- 1 tab(s) by mouth once a day  -- Indication: For Hypothyroidism, unspecified type    Vitamin D3 50,000 intl units oral capsule  -- orally once a week  -- Indication: For supplement I will START or STAY ON the medications listed below when I get home from the hospital:    acetaminophen 325 mg oral tablet  -- 2 tab(s) by mouth every 6 hours, As needed, mild pain (1-3) or For Temp greater than 38 C (100.4 F)  -- Indication: For mild pain, fever    traMADol 50 mg oral tablet  -- 0.5 tab(s) by mouth every 4 hours, As needed, Moderate Pain (4 - 6)  -- Indication: For moderate pain    traMADol 50 mg oral tablet  -- 1 tab(s) by mouth every 4 hours, As needed, Severe Pain (7 - 10)  -- Indication: For severe pain    Eliquis 2.5 mg oral tablet  -- 1 tab(s) by mouth 2 times a day  -- Indication: For dvt ppx/ atrial fibrillation    Metoprolol Succinate ER 50 mg oral tablet, extended release  -- 1 tab(s) by mouth 2 times a day  -- Indication: For rate control hx of atrial fibrillation    acetaZOLAMIDE 250 mg oral tablet  -- 1 tab(s) by mouth 2 times a day  -- Indication: For Home diuretic    bisacodyl 10 mg rectal suppository  -- 1 suppository(ies) rectally once a day, As needed, If no bowel movement by POD#2  -- Indication: For Constipation    docusate sodium 100 mg oral capsule  -- 1 cap(s) by mouth 3 times a day  -- Indication: For Constipation    polyethylene glycol 3350 oral powder for reconstitution  -- 17 gram(s) by mouth once a day  -- Indication: For Constipation    senna oral tablet  -- 2 tab(s) by mouth once a day (at bedtime), As needed, Constipation  -- Indication: For Constipation    Lumigan 0.01% ophthalmic solution  -- 1 drop(s) to each affected eye once a day (in the evening)  -- Indication: For Home opthalmic preparation    [place] Thyroid 30 mg oral tablet  -- 1 tab(s) by mouth once a day  -- Indication: For Hypothyroidism, unspecified type    Vitamin D3 50,000 intl units oral capsule  -- orally once a week  -- Indication: For supplement I will START or STAY ON the medications listed below when I get home from the hospital:    acetaminophen 325 mg oral tablet  -- 2 tab(s) by mouth every 6 hours, As needed, mild pain (1-3) or For Temp greater than 38 C (100.4 F)  -- Indication: For mild pain, fever    traMADol 50 mg oral tablet  -- 0.5 tab(s) by mouth every 4 hours, As needed, Moderate Pain (4 - 6)  -- Indication: For moderate pain    traMADol 50 mg oral tablet  -- 1 tab(s) by mouth every 4 hours, As needed, Severe Pain (7 - 10)  -- Indication: For severe pain    Eliquis 2.5 mg oral tablet  -- 1 tab(s) by mouth 2 times a day  -- Indication: For dvt ppx/ atrial fibrillation    Metoprolol Succinate ER 50 mg oral tablet, extended release  -- 1 tab(s) by mouth 2 times a day  -- Indication: For rate control hx of atrial fibrillation    acetaZOLAMIDE 250 mg oral tablet  -- 1 tab(s) by mouth 2 times a day  -- Indication: For Home diuretic    bisacodyl 10 mg rectal suppository  -- 1 suppository(ies) rectally once a day, As needed, If no bowel movement by POD#2  -- Indication: For Constipation    docusate sodium 100 mg oral capsule  -- 1 cap(s) by mouth 3 times a day  -- Indication: For Constipation    polyethylene glycol 3350 oral powder for reconstitution  -- 17 gram(s) by mouth once a day  -- Indication: For Constipation    senna oral tablet  -- 2 tab(s) by mouth once a day (at bedtime), As needed, Constipation  -- Indication: For Constipation    Lumigan 0.01% ophthalmic solution  -- 1 drop(s) to each affected eye once a day (in the evening)  -- Indication: For Home opthalmic preparation    [place] Thyroid 30 mg oral tablet  -- 1 tab(s) by mouth once a day  -- Indication: For Hypothyroidism, unspecified type    Vitamin D3 50,000 intl units oral capsule  -- orally once a week  -- Indication: For supplement

## 2018-07-09 NOTE — PROGRESS NOTE ADULT - SUBJECTIVE AND OBJECTIVE BOX
INTERVAL HPI/OVERNIGHT EVENTS:    VITAL SIGNS:  T(F): 99.1 (07-09-18 @ 06:18)  HR: 61 (07-09-18 @ 06:18)  BP: 106/63 (07-09-18 @ 06:18)  RR: 16 (07-09-18 @ 06:18)  SpO2: 100% (07-09-18 @ 06:18)  Wt(kg): --    PHYSICAL EXAM:  Constitutional:  Eyes:  ENMT:  Neck:  Respiratory:  Cardiovascular:  Gastrointestinal:  Extremities:  Vascular:  Neurological:  Musculoskeletal:    MEDICATIONS  (STANDING):  acetaminophen   Tablet. 975 milliGRAM(s) Oral every 8 hours  acetazolamide    Tablet 250 milliGRAM(s) Oral two times a day  apixaban 2.5 milliGRAM(s) Oral every 12 hours  bimatoprost 0.01% Ophthalmic Solution 1 Drop(s) Both EYES at bedtime  ceFAZolin   IVPB 1500 milliGRAM(s) IV Intermittent every 8 hours  docusate sodium 100 milliGRAM(s) Oral three times a day  docusate sodium 100 milliGRAM(s) Oral three times a day  ergocalciferol 41716 Unit(s) Oral every week  metoprolol succinate ER 50 milliGRAM(s) Oral two times a day  multivitamin 1 Tablet(s) Oral daily  polyethylene glycol 3350 17 Gram(s) Oral daily  polyethylene glycol 3350 17 Gram(s) Oral daily  senna 2 Tablet(s) Oral at bedtime  thyroid 30 milliGRAM(s) Oral daily    MEDICATIONS  (PRN):  acetaminophen   Tablet 650 milliGRAM(s) Oral every 6 hours PRN For Temp greater than 38 C (100.4 F)  aluminum hydroxide/magnesium hydroxide/simethicone Suspension 30 milliLiter(s) Oral four times a day PRN Indigestion  metoclopramide Injectable 10 milliGRAM(s) IV Push every 6 hours PRN Nausea and/or Vomiting  morphine  - Injectable 2 milliGRAM(s) IV Push every 4 hours PRN breakthrough pain  ondansetron Injectable 4 milliGRAM(s) IV Push every 6 hours PRN Nausea and/or Vomiting  senna 2 Tablet(s) Oral at bedtime PRN Constipation  traMADol 25 milliGRAM(s) Oral every 4 hours PRN Moderate Pain (4 - 6)  traMADol 50 milliGRAM(s) Oral every 4 hours PRN Severe Pain (7 - 10)    Allergies  No Known Allergies    LABS:                        11.1   9.1   )-----------( 216      ( 09 Jul 2018 07:04 )             34.7     07-09    133<L>  |  99  |  25<H>  ----------------------------<  107<H>  3.7   |  21<L>  |  0.72    Ca    8.7      09 Jul 2018 07:04  Phos  4.2     07-09  Mg     2.1     07-09    PT/INR - ( 08 Jul 2018 07:03 )   PT: 12.2 sec;   INR: 1.10        PTT - ( 08 Jul 2018 07:03 )  PTT:31.2 sec INTERVAL HPI/OVERNIGHT EVENTS:  Patient states feeling well. Says that patient has never been an issue and is well controlled. Has been doing incentive spirometry and had a BM yesterday. Denies any lightheadedness, CP, SOB, dizziness, or nausea.     VITAL SIGNS:  T(F): 99.1 (07-09-18 @ 06:18)  HR: 61 (07-09-18 @ 06:18)  BP: 106/63 (07-09-18 @ 06:18)  RR: 16 (07-09-18 @ 06:18)  SpO2: 100% (07-09-18 @ 06:18)  Wt(kg): --    PHYSICAL EXAM:  Constitutional: well appearing, in bed in no acute distress  Eyes: EOMI  ENMT: dry MM  Respiratory: CTA bilaterally  Cardiovascular: RRR, systolic murmur loudest in R upper sternal border  Gastrointestinal: normoactive BS, abdomen soft, NTND  Extremities: symmetric, LE non pitting edema, no hematomas appreciated   Vascular: + DP and radial pulses    MEDICATIONS  (STANDING):  acetaminophen   Tablet. 975 milliGRAM(s) Oral every 8 hours  acetazolamide    Tablet 250 milliGRAM(s) Oral two times a day  apixaban 2.5 milliGRAM(s) Oral every 12 hours  bimatoprost 0.01% Ophthalmic Solution 1 Drop(s) Both EYES at bedtime  ceFAZolin   IVPB 1500 milliGRAM(s) IV Intermittent every 8 hours  docusate sodium 100 milliGRAM(s) Oral three times a day  docusate sodium 100 milliGRAM(s) Oral three times a day  ergocalciferol 00511 Unit(s) Oral every week  metoprolol succinate ER 50 milliGRAM(s) Oral two times a day  multivitamin 1 Tablet(s) Oral daily  polyethylene glycol 3350 17 Gram(s) Oral daily  polyethylene glycol 3350 17 Gram(s) Oral daily  senna 2 Tablet(s) Oral at bedtime  thyroid 30 milliGRAM(s) Oral daily    MEDICATIONS  (PRN):  acetaminophen   Tablet 650 milliGRAM(s) Oral every 6 hours PRN For Temp greater than 38 C (100.4 F)  aluminum hydroxide/magnesium hydroxide/simethicone Suspension 30 milliLiter(s) Oral four times a day PRN Indigestion  metoclopramide Injectable 10 milliGRAM(s) IV Push every 6 hours PRN Nausea and/or Vomiting  morphine  - Injectable 2 milliGRAM(s) IV Push every 4 hours PRN breakthrough pain  ondansetron Injectable 4 milliGRAM(s) IV Push every 6 hours PRN Nausea and/or Vomiting  senna 2 Tablet(s) Oral at bedtime PRN Constipation  traMADol 25 milliGRAM(s) Oral every 4 hours PRN Moderate Pain (4 - 6)  traMADol 50 milliGRAM(s) Oral every 4 hours PRN Severe Pain (7 - 10)    Allergies  No Known Allergies    LABS:                        11.1   9.1   )-----------( 216      ( 09 Jul 2018 07:04 )             34.7     07-09    133<L>  |  99  |  25<H>  ----------------------------<  107<H>  3.7   |  21<L>  |  0.72    Ca    8.7      09 Jul 2018 07:04  Phos  4.2     07-09  Mg     2.1     07-09    PT/INR - ( 08 Jul 2018 07:03 )   PT: 12.2 sec;   INR: 1.10        PTT - ( 08 Jul 2018 07:03 )  PTT:31.2 sec

## 2018-07-09 NOTE — DISCHARGE NOTE ADULT - VISION (WITH CORRECTIVE LENSES IF THE PATIENT USUALLY WEARS THEM):
Severely impaired: cannot locate objects without hearing or touching them or patient nonresponsive./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; wear glasses

## 2018-07-09 NOTE — PROGRESS NOTE ADULT - ASSESSMENT
88yo F with PMHx of hypertension, atrial fibrillation on eliquis, hypothyroidism, glaucoma, retinitis pigmentosa who presents with L femoral neck fracture. Medicine consulted for pre-op assessment. Patient now POD #1 of L kim-arthroplasty.     # Anemia. 2/2 blood loss from arthroplasty.   - Monitor closely since patient is on AC. Patient not likely to become tachycardic if there is further blood loss given she is beta blocked, but if there is any lower than usual BP, obtain a stat CBC and assess area of surgery for possible hematoma.    # Vitamin D deficiency. Patient with signs of osteopenia on xray and vitamin D level of 23.6  - Replace with 600-800 of vitamin D3 daily.    # Atrial fibrillation.   - Continue Toprol XL.  - Eliquis restarted today.    # Hypothyroidism.   - Continue home synthroid.     INCOMPLETE 88yo F with PMHx of hypertension, atrial fibrillation on eliquis, hypothyroidism, glaucoma, retinitis pigmentosa who presents with L femoral neck fracture. Medicine consulted for pre-op assessment. Patient now POD #1 of arthroplasty.     # Anemia. 2/2 blood loss from arthroplasty.   - Monitor closely since patient is on AC. Patient not likely to become tachycardic if there is further blood loss given she is beta blocked, but if there is any lower than usual BP, obtain a stat CBC and assess area of surgery for possible hematoma.    # Vitamin D deficiency. Patient with signs of osteopenia on xray and vitamin D level of 23.6  - Replace with 600-800 of vitamin D3 daily.    # Atrial fibrillation.   - Continue Toprol XL.  - Eliquis restarted today.    # Hypothyroidism.   - Continue home synthroid. 88yo F with PMHx of hypertension, atrial fibrillation on eliquis, hypothyroidism, glaucoma, retinitis pigmentosa who presents with L femoral neck fracture. Medicine consulted for pre-op assessment. Patient now POD #1 of arthroplasty.     # Anemia. 2/2 blood loss from arthroplasty.   - Monitor closely since patient is on AC. Patient not likely to become tachycardic if there is further blood loss given she is beta blocked, but if there is any lower than usual BP, obtain a stat CBC and assess area of surgery for possible hematoma.  - Continue to trend.     # Vitamin D deficiency. Patient with signs of osteopenia on xray and vitamin D level of 23.6  - Replace with 600-800 of vitamin D3 daily.    # Atrial fibrillation.   - Continue Toprol XL.  - Eliquis restarted today.    # Hypothyroidism.   - Continue home synthroid.

## 2018-07-09 NOTE — DISCHARGE NOTE ADULT - MEDICATION SUMMARY - MEDICATIONS TO STOP TAKING
I will STOP taking the medications listed below when I get home from the hospital:    Synthroid  -- orally once a day

## 2018-07-09 NOTE — PHYSICAL THERAPY INITIAL EVALUATION ADULT - LIVES WITH, PROFILE
alone/apartment, elevator, no steps, 1 fall in past year that brought patient to ED, no hha alone/apartment, elevator, 1 step, 1 fall in past year that brought patient to ED, no hha

## 2018-07-09 NOTE — DISCHARGE NOTE ADULT - PLAN OF CARE
Improved ambulation and decreased pain after left hip intramedullary nailing on 7/8/18 Weight bearing as tolerated on left leg with assistance.  Patient is 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 and will require assistance for ADLs  Posterior hip precautions-- keep abduction pillow implemented  No strenuous activity, heavy lifting, driving, tub bathing, or returning to work until cleared by MD.  You may shower--dressing is waterproof.  Remove dressing after post op day 7, then leave incision open to air.  Follow up with Dr. Barlow to schedule an appt within 10-14 days.  If you don't have a bowel movement by post op day 3, then take Milk of Magnesia (over the counter).  If no bowel movement by at least post op day 5, then use a Dulcolax suppository (over the counter) and/or a Fleets enema--if still no bowel movement, call your MD.  Contact your doctor if you experience: fever greater than 101.5, chills, chest pain, difficulty breathing, bleeding, redness or heat around the incision. Improved ambulation and decreased pain after left hip hemiarthroplasy (posterior approach) on 7/8/18 Improved ambulation and decreased pain after left hip hemiarthroplasty (posterior approach) on 7/8/18 Weight bearing as tolerated on left leg with assistance.  Patient is 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 and will require assistance for ADLs  Posterior hip precautions-- keep abduction pillow implemented  No strenuous activity, heavy lifting, driving, tub bathing, or returning to work until cleared by MD.  You may shower--dressing is waterproof.  Remove dressing after post op day 7, then leave incision open to air.  Follow up with Dr. Barlow to schedule an appt within 10-14 days.  If you don't have a bowel movement by post op day 3, then take Milk of Magnesia (over the counter).  If no bowel movement by at least post op day 5, then use a Dulcolax suppository (over the counter) and/or a Fleets enema--if still no bowel movement, call your MD.  Contact your doctor if you experience: fever greater than 101.5, chills, chest pain, difficulty breathing, bleeding, redness or heat around the incision.  continue home medication for chronic atrial fibrillation

## 2018-07-09 NOTE — PHYSICAL THERAPY INITIAL EVALUATION ADULT - GENERAL OBSERVATIONS, REHAB EVAL
Patient received supine in NAD, denies pain +EKG, IV hep, abduction pillow, B SCD. Left as found +RN Hailee aware, call roldan in reach

## 2018-07-09 NOTE — DISCHARGE NOTE ADULT - CARE PROVIDER_API CALL
Marcellus Addison), Orthopaedic Surgery Surgery  [address] [place] [address]  Phone: [phone]  Fax: [phone]

## 2018-07-09 NOTE — PHYSICAL THERAPY INITIAL EVALUATION ADULT - GAIT DEVIATIONS NOTED, PT EVAL
decreased harshad/decreased step length/B knee buckling, retropulsion, mod A to manuever RW/decreased weight-shifting ability

## 2018-07-09 NOTE — DISCHARGE NOTE ADULT - PATIENT PORTAL LINK FT
You can access the [facility] Portal, offered by [facility], by registering with the following website: [email or web address]

## 2018-07-09 NOTE — PROGRESS NOTE ADULT - SUBJECTIVE AND OBJECTIVE BOX
S: No acute events overnight.    Vital Signs Last 24 Hrs  T(C): 36.4 (08 Jul 2018 22:00), Max: 36.7 (08 Jul 2018 09:17)  T(F): 97.5 (08 Jul 2018 22:00), Max: 98 (08 Jul 2018 09:17)  HR: 62 (09 Jul 2018 02:00) (60 - 78)  BP: 104/60 (09 Jul 2018 02:00) (97/57 - 136/69)  BP(mean): 77 (09 Jul 2018 02:00) (74 - 102)  RR: 16 (09 Jul 2018 02:00) (15 - 25)  SpO2: 99% (09 Jul 2018 02:00) (90% - 100%)  I&O's Summary    07 Jul 2018 07:01  -  08 Jul 2018 07:00  --------------------------------------------------------  IN: 640 mL / OUT: 750 mL / NET: -110 mL    08 Jul 2018 07:01  -  09 Jul 2018 06:20  --------------------------------------------------------  IN: 1610 mL / OUT: 680 mL / NET: 930 mL        Dressing CDI  Motor: 5/5 GS/TA/EHL/FHL    Sensation: SILT sural, saph, DP, SP and tibial n distribution  Pulses: WWP, DP/PT 2+                            12.7   12.8  )-----------( 233      ( 08 Jul 2018 12:16 )             39.4     07-08    136  |  100  |  20  ----------------------------<  107<H>  4.0   |  23  |  0.87    Ca    9.7      08 Jul 2018 06:59        MEDICATIONS  (STANDING):  acetazolamide    Tablet 250 milliGRAM(s) Oral two times a day  apixaban 2.5 milliGRAM(s) Oral every 12 hours  bimatoprost 0.01% Ophthalmic Solution 1 Drop(s) Both EYES at bedtime  BUpivacaine liposome 1.3% Injectable (no eMAR) 20 milliLiter(s) Local Injection once  ceFAZolin   IVPB 1500 milliGRAM(s) IV Intermittent every 8 hours  docusate sodium 100 milliGRAM(s) Oral three times a day  docusate sodium 100 milliGRAM(s) Oral three times a day  ergocalciferol 53033 Unit(s) Oral every week  lactated ringers. 1000 milliLiter(s) (80 mL/Hr) IV Continuous <Continuous>  lactated ringers. 1000 milliLiter(s) (80 mL/Hr) IV Continuous <Continuous>  latanoprost 0.005% Ophthalmic Solution 1 Drop(s) Both EYES at bedtime  metoprolol succinate ER 50 milliGRAM(s) Oral two times a day  multivitamin 1 Tablet(s) Oral daily  polyethylene glycol 3350 17 Gram(s) Oral daily  polyethylene glycol 3350 17 Gram(s) Oral daily  senna 2 Tablet(s) Oral at bedtime  thyroid 30 milliGRAM(s) Oral daily    MEDICATIONS  (PRN):  acetaminophen   Tablet 650 milliGRAM(s) Oral every 6 hours PRN For Temp greater than 38 C (100.4 F)  acetaminophen   Tablet. 650 milliGRAM(s) Oral every 6 hours PRN Mild Pain (1 - 3)  aluminum hydroxide/magnesium hydroxide/simethicone Suspension 30 milliLiter(s) Oral four times a day PRN Indigestion  metoclopramide Injectable 10 milliGRAM(s) IV Push every 6 hours PRN Nausea and/or Vomiting  morphine  - Injectable 2 milliGRAM(s) IV Push every 4 hours PRN breakthrough pain  ondansetron Injectable 4 milliGRAM(s) IV Push every 6 hours PRN Nausea and/or Vomiting  senna 2 Tablet(s) Oral at bedtime PRN Constipation  traMADol 25 milliGRAM(s) Oral every 4 hours PRN Moderate Pain (4 - 6)  traMADol 50 milliGRAM(s) Oral every 4 hours PRN Severe Pain (7 - 10)      A/P:  89y Female s/p L hip kim, POD 1  -Stable  -Pain Control  -PT/WBAT  -DVT ppx: Eliquis restart today  -Dispo: Pending  -Post hip precautions  -Abduction pillow

## 2018-07-09 NOTE — PROGRESS NOTE ADULT - SUBJECTIVE AND OBJECTIVE BOX
Chief Complaint/Reason for Consult: periop cv mgmt  INTERVAL HPI: Denies any lightheadedness, CP, SOB, dizziness, or nausea.     	  MEDICATIONS:  acetazolamide    Tablet 250 milliGRAM(s) Oral two times a day  metoprolol succinate ER 50 milliGRAM(s) Oral two times a day        acetaminophen   Tablet 650 milliGRAM(s) Oral every 6 hours PRN  acetaminophen   Tablet. 975 milliGRAM(s) Oral every 8 hours  metoclopramide Injectable 10 milliGRAM(s) IV Push every 6 hours PRN  morphine  - Injectable 2 milliGRAM(s) IV Push every 4 hours PRN  ondansetron Injectable 4 milliGRAM(s) IV Push every 6 hours PRN  traMADol 25 milliGRAM(s) Oral every 4 hours PRN  traMADol 50 milliGRAM(s) Oral every 4 hours PRN    aluminum hydroxide/magnesium hydroxide/simethicone Suspension 30 milliLiter(s) Oral four times a day PRN  docusate sodium 100 milliGRAM(s) Oral three times a day  docusate sodium 100 milliGRAM(s) Oral three times a day  polyethylene glycol 3350 17 Gram(s) Oral daily  polyethylene glycol 3350 17 Gram(s) Oral daily  senna 2 Tablet(s) Oral at bedtime  senna 2 Tablet(s) Oral at bedtime PRN    thyroid 30 milliGRAM(s) Oral daily    apixaban 2.5 milliGRAM(s) Oral every 12 hours  bimatoprost 0.01% Ophthalmic Solution 1 Drop(s) Both EYES at bedtime  cholecalciferol 1000 Unit(s) Oral daily  multivitamin 1 Tablet(s) Oral daily      REVIEW OF SYSTEMS:  [x] As per HPI  CONSTITUTIONAL: No fever, weight loss, or fatigue  RESPIRATORY: No cough, wheezing, chills or hemoptysis; No Shortness of Breath  CARDIOVASCULAR: No chest pain, palpitations, dizziness, or leg swelling  GASTROINTESTINAL: No abdominal or epigastric pain. No nausea, vomiting, or hematemesis; No diarrhea or constipation. No melena or hematochezia.  MUSCULOSKELETAL: No joint pain or swelling; No muscle, back, or extremity pain  [x] All others negative	  [ ] Unable to obtain    PHYSICAL EXAM:  T(C): 36.7 (07-09-18 @ 12:29), Max: 37.3 (07-09-18 @ 06:18)  HR: 71 (07-09-18 @ 12:29) (61 - 78)  BP: 127/63 (07-09-18 @ 12:29) (92/50 - 127/63)  RR: 15 (07-09-18 @ 12:29) (15 - 16)  SpO2: 100% (07-09-18 @ 12:29) (99% - 100%)  Wt(kg): --  I&O's Summary    08 Jul 2018 07:01  -  09 Jul 2018 07:00  --------------------------------------------------------  IN: 1940 mL / OUT: 880 mL / NET: 1060 mL    09 Jul 2018 07:01  -  09 Jul 2018 17:02  --------------------------------------------------------  IN: 130 mL / OUT: 0 mL / NET: 130 mL          Appearance: Normal	  HEENT:   Normal oral mucosa  Cardiovascular: Normal S1 S2, No JVD, No murmurs, No edema  Respiratory: Lungs clear to auscultation	  Gastrointestinal:  Soft, Non-tender, + BS	  Extremities: Normal range of motion, No clubbing, cyanosis or edema  Vascular: Peripheral pulses palpable 2+ bilaterally    TELEMETRY: 	    ECG:   	  RADIOLOGY:   CXR:  CT:  US:    CARDIAC TESTING:  Echocardiogram:  Catheterization:  Stress Test:      LABS:	 	    CARDIAC MARKERS:                                  11.1   9.1   )-----------( 216      ( 09 Jul 2018 07:04 )             34.7     07-09    133<L>  |  99  |  25<H>  ----------------------------<  107<H>  3.7   |  21<L>  |  0.72    Ca    8.7      09 Jul 2018 07:04  Phos  4.2     07-09  Mg     2.1     07-09      proBNP:   Lipid Profile:   HgA1c:   TSH:     ASSESSMENT/PLAN: 	    # post op s complications, no rvr on tele no pauses    Afib on Eliquis-EKG currently NSR. Likely paroxysmal. Resume eliquis. [id number].     Aortic Stenosis-Patient with AS on echo. Valve area 1.6 with peak gradient 33mmHg. Moderate AS. EF 55-60%  -Be cautious with with perioperative IVF.    HTN-  Continue with Metoprolol.

## 2018-07-09 NOTE — DISCHARGE NOTE ADULT - INSTRUCTIONS
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

## 2018-07-09 NOTE — PROGRESS NOTE ADULT - SUBJECTIVE AND OBJECTIVE BOX
ORTHO NOTE    [x ] Pt seen/examined at 7:50am  [x ] Pt without any complaints/in NAD.    [ ] Pt complains of:      ROS: [ ] Fever  [ ] Chills  [ ] CP [ ] SOB [ ] Dysnea  [ ] Palpitations [ ] Cough [ ] N/V/C/D [ ] Paresthia [ ] Other     [x ] ROS  otherwise negative    .    PHYSICAL EXAM:    Vital Signs Last 24 Hrs  T(C): 37.3 (09 Jul 2018 06:18), Max: 37.3 (09 Jul 2018 06:18)  T(F): 99.1 (09 Jul 2018 06:18), Max: 99.1 (09 Jul 2018 06:18)  HR: 61 (09 Jul 2018 06:18) (60 - 78)  BP: 106/63 (09 Jul 2018 06:18) (97/57 - 136/69)  BP(mean): 77 (09 Jul 2018 02:00) (74 - 102)  RR: 16 (09 Jul 2018 06:18) (15 - 25)  SpO2: 100% (09 Jul 2018 06:18) (90% - 100%)    I&O's Detail    08 Jul 2018 07:01  -  09 Jul 2018 07:00  --------------------------------------------------------  IN:    IV PiggyBack: 100 mL    lactated ringers.: 240 mL    lactated ringers.: 480 mL    Oral Fluid: 120 mL    Other: 1000 mL  Total IN: 1940 mL    OUT:    Estimated Blood Loss: 200 mL    Indwelling Catheter - Urethral: 480 mL    Voided: 200 mL  Total OUT: 880 mL    Total NET: 1060 mL      09 Jul 2018 07:01  -  09 Jul 2018 09:14  --------------------------------------------------------  IN:    lactated ringers.: 80 mL  Total IN: 80 mL    OUT:  Total OUT: 0 mL    Total NET: 80 mL           CAPILLARY BLOOD GLUCOSE                      Neuro: AAOX3, pleasant and participates in exam    Lungs: nonlabored, Spo2 wnl on RA, IS demonstrated    CV: HR 70s, atrial fibrillation    ABD: soft, nontender    Ext: left posterior hip aquacel dressing cdi, left LE nvid motor 5/5, abduction pillow implemented, passive knee flexion performed on patient    LABS                        11.1   9.1   )-----------( 216      ( 09 Jul 2018 07:04 )             34.7                              PT/INR - ( 08 Jul 2018 07:03 )   PT: 12.2 sec;   INR: 1.10          PTT - ( 08 Jul 2018 07:03 )  PTT:31.2 sec  07-09    133<L>  |  99  |  25<H>  ----------------------------<  107<H>  3.7   |  21<L>  |  0.72    Ca    8.7      09 Jul 2018 07:04  Phos  4.2     07-09  Mg     2.1     07-09        [ ] Other Labs  [ ] None ordered            Please check or [place] when present:  •  Heart Failure:    [ ] Acute        [ ]  Acute on Chronic        [ ] Chronic         [ ] Diastolic     [ ]  Combined    •  PEREZ:     [ ] ATN        [ ]  Renal medullary necrosis       [ ]  Renal cortical necrosis                  [ ] Other pathological Lesion:  •  CKD:  [ ] Stage I   [ ] Stage II  [ ] Stage III    [ ]Stage IV   [ ]  CKD V   [ ]  Other/Unspecified:    •  Abdominal Nutritional Status:   [ ] Malnutrition-See Nutrition note    [ ] Cachexia   [ ]  Other        [ ] Supplement ordered:            [ ] Morbid Obesity: BMI >=40         ASSESSMENT/PLAN:      STATUS POST: pod1 L hip hemiarthroplasty (posterior approach)  pain control  bowel regimen  encourage IS and po intake  JOHNATHON dubois  legally blind- continue home opthalmic preparations, verbal cues  CONTINUE:          [ ] PT/OT- wbat, posterior hip precautions    [ ] DVT PPX- scd, eliquis 2.5    [ ] Pain Mgt- acetaminophen 975mg q 8, prn tramadol    [ ] Dispo plan- pending PT evaluation but will require [facility]

## 2018-07-09 NOTE — DISCHARGE NOTE ADULT - ADDITIONAL INSTRUCTIONS
Follow up with your primary care provider Follow up with your primary care provider and cardiologist.  Patient has chronic atrial fibrillation and take Eliquis 2.5mg po bid at home

## 2018-07-10 DIAGNOSIS — I48.2 CHRONIC ATRIAL FIBRILLATION: ICD-10-CM

## 2018-07-10 DIAGNOSIS — S72.009A FRACTURE OF UNSPECIFIED PART OF NECK OF UNSPECIFIED FEMUR, INITIAL ENCOUNTER FOR CLOSED FRACTURE: ICD-10-CM

## 2018-07-10 DIAGNOSIS — E87.8 OTHER DISORDERS OF ELECTROLYTE AND FLUID BALANCE, NOT ELSEWHERE CLASSIFIED: ICD-10-CM

## 2018-07-10 DIAGNOSIS — E03.9 HYPOTHYROIDISM, UNSPECIFIED: ICD-10-CM

## 2018-07-10 LAB
ANION GAP SERPL CALC-SCNC: 16 MMOL/L — SIGNIFICANT CHANGE UP (ref 5–17)
BUN SERPL-MCNC: 26 MG/DL — HIGH (ref 7–23)
CALCIUM SERPL-MCNC: 9.1 MG/DL — SIGNIFICANT CHANGE UP (ref 8.4–10.5)
CHLORIDE SERPL-SCNC: 97 MMOL/L — SIGNIFICANT CHANGE UP (ref 96–108)
CO2 SERPL-SCNC: 18 MMOL/L — LOW (ref 22–31)
CREAT SERPL-MCNC: 0.71 MG/DL — SIGNIFICANT CHANGE UP (ref 0.5–1.3)
GLUCOSE SERPL-MCNC: 148 MG/DL — HIGH (ref 70–99)
HCT VFR BLD CALC: 35.7 % — SIGNIFICANT CHANGE UP (ref 34.5–45)
HGB BLD-MCNC: 11 G/DL — LOW (ref 11.5–15.5)
MAGNESIUM SERPL-MCNC: 2.2 MG/DL — SIGNIFICANT CHANGE UP (ref 1.6–2.6)
MCHC RBC-ENTMCNC: 28.4 PG — SIGNIFICANT CHANGE UP (ref 27–34)
MCHC RBC-ENTMCNC: 30.8 G/DL — LOW (ref 32–36)
MCV RBC AUTO: 92.2 FL — SIGNIFICANT CHANGE UP (ref 80–100)
PHOSPHATE SERPL-MCNC: 3.3 MG/DL — SIGNIFICANT CHANGE UP (ref 2.5–4.5)
PLATELET # BLD AUTO: 241 K/UL — SIGNIFICANT CHANGE UP (ref 150–400)
POTASSIUM SERPL-MCNC: 3.4 MMOL/L — LOW (ref 3.5–5.3)
POTASSIUM SERPL-SCNC: 3.4 MMOL/L — LOW (ref 3.5–5.3)
RBC # BLD: 3.87 M/UL — SIGNIFICANT CHANGE UP (ref 3.8–5.2)
RBC # FLD: 13.8 % — SIGNIFICANT CHANGE UP (ref 10.3–16.9)
SODIUM SERPL-SCNC: 131 MMOL/L — LOW (ref 135–145)
WBC # BLD: 11.7 K/UL — HIGH (ref 3.8–10.5)
WBC # FLD AUTO: 11.7 K/UL — HIGH (ref 3.8–10.5)

## 2018-07-10 PROCEDURE — 99233 SBSQ HOSP IP/OBS HIGH 50: CPT | Mod: GC

## 2018-07-10 PROCEDURE — 99232 SBSQ HOSP IP/OBS MODERATE 35: CPT

## 2018-07-10 RX ORDER — POTASSIUM CHLORIDE 20 MEQ
40 PACKET (EA) ORAL ONCE
Qty: 0 | Refills: 0 | Status: COMPLETED | OUTPATIENT
Start: 2018-07-10 | End: 2018-07-10

## 2018-07-10 RX ORDER — SODIUM CHLORIDE 9 MG/ML
250 INJECTION INTRAMUSCULAR; INTRAVENOUS; SUBCUTANEOUS ONCE
Qty: 0 | Refills: 0 | Status: COMPLETED | OUTPATIENT
Start: 2018-07-10 | End: 2018-07-10

## 2018-07-10 RX ORDER — THYROID 120 MG
1 TABLET ORAL
Qty: 30 | Refills: 0 | OUTPATIENT
Start: 2018-07-10 | End: 2018-08-08

## 2018-07-10 RX ADMIN — APIXABAN 2.5 MILLIGRAM(S): 2.5 TABLET, FILM COATED ORAL at 17:43

## 2018-07-10 RX ADMIN — ACETAZOLAMIDE 250 MILLIGRAM(S): 250 TABLET ORAL at 07:47

## 2018-07-10 RX ADMIN — Medication 40 MILLIEQUIVALENT(S): at 13:09

## 2018-07-10 RX ADMIN — Medication 50 MILLIGRAM(S): at 17:42

## 2018-07-10 RX ADMIN — Medication 975 MILLIGRAM(S): at 07:48

## 2018-07-10 RX ADMIN — Medication 975 MILLIGRAM(S): at 08:48

## 2018-07-10 RX ADMIN — Medication 100 MILLIGRAM(S): at 17:41

## 2018-07-10 RX ADMIN — Medication 1000 UNIT(S): at 12:04

## 2018-07-10 RX ADMIN — ACETAZOLAMIDE 250 MILLIGRAM(S): 250 TABLET ORAL at 17:44

## 2018-07-10 RX ADMIN — Medication 50 MILLIGRAM(S): at 07:47

## 2018-07-10 RX ADMIN — Medication 100 MILLIGRAM(S): at 07:48

## 2018-07-10 RX ADMIN — APIXABAN 2.5 MILLIGRAM(S): 2.5 TABLET, FILM COATED ORAL at 07:48

## 2018-07-10 RX ADMIN — Medication 1 TABLET(S): at 12:04

## 2018-07-10 RX ADMIN — SODIUM CHLORIDE 500 MILLILITER(S): 9 INJECTION INTRAMUSCULAR; INTRAVENOUS; SUBCUTANEOUS at 13:07

## 2018-07-10 RX ADMIN — Medication 30 MILLIGRAM(S): at 13:04

## 2018-07-10 RX ADMIN — POLYETHYLENE GLYCOL 3350 17 GRAM(S): 17 POWDER, FOR SOLUTION ORAL at 12:04

## 2018-07-10 NOTE — OCCUPATIONAL THERAPY INITIAL EVALUATION ADULT - STANDING BALANCE: DYNAMIC, REHAB EVAL
poor plus/Patient ambulated approximately 12 feet to toilet and back with RW and min Ax2 +VC for sequencing, shuffling steps

## 2018-07-10 NOTE — PROGRESS NOTE ADULT - SUBJECTIVE AND OBJECTIVE BOX
ORTHO NOTE    [x ] Pt seen/examined at 8am and then when daughter Starr present  [x ] Pt without any complaints/in NAD.    [ ] Pt complains of:      ROS: [ ] Fever  [ ] Chills  [ ] CP [ ] SOB [ ] Dysnea  [ ] Palpitations [ ] Cough [ ] N/V/C/D [ ] Paresthia [ ] Other     [x ] ROS  otherwise negative    .    PHYSICAL EXAM:    Vital Signs Last 24 Hrs  T(C): 36.8 (10 Jul 2018 04:53), Max: 37.1 (09 Jul 2018 17:11)  T(F): 98.2 (10 Jul 2018 04:53), Max: 98.7 (09 Jul 2018 17:11)  HR: 68 (10 Jul 2018 11:30) (63 - 74)  BP: 105/56 (10 Jul 2018 11:40) (88/51 - 111/74)  BP(mean): --  RR: 15 (10 Jul 2018 04:53) (15 - 16)  SpO2: 100% (10 Jul 2018 11:30) (100% - 100%)    I&O's Detail    09 Jul 2018 07:01  -  10 Jul 2018 07:00  --------------------------------------------------------  IN:    IV PiggyBack: 50 mL    lactated ringers.: 80 mL  Total IN: 130 mL    OUT:    Voided: 150 mL  Total OUT: 150 mL    Total NET: -20 mL      10 Jul 2018 07:01  -  10 Jul 2018 12:50  --------------------------------------------------------  IN:  Total IN: 0 mL    OUT:    Voided: 250 mL  Total OUT: 250 mL    Total NET: -250 mL           CAPILLARY BLOOD GLUCOSE                      Neuro: AAOX3, 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 with eye glasses on     Lungs: nonlabored, CTA, IS demonstrated with assistnace    CV: atrial fibrillation, HR stable    ABD: soft, nontender    Ext: left posterior hip dressing CDI  Motor: 5/5 GS/TA/EHL/FHL    Sensation: SILT sural, saph, DP, SP and tibial n distribution  Pulses: WWP, DP/PT 2+    abduction pillow implemented  LABS                        11.0   11.7  )-----------( 241      ( 10 Jul 2018 10:45 )             35.7                                07-10    131<L>  |  97  |  26<H>  ----------------------------<  148<H>  3.4<L>   |  18<L>  |  0.71    Ca    9.1      10 Jul 2018 10:45  Phos  3.3     07-10  Mg     2.2     07-10        [ ] Other Labs  [ ] None ordered            Please check or [place] when present:  •  Heart Failure:    [ ] Acute        [ ]  Acute on Chronic        [ ] Chronic         [ ] Diastolic     [ ]  Combined    •  PEREZ:     [ ] ATN        [ ]  Renal medullary necrosis       [ ]  Renal cortical necrosis                  [ ] Other pathological Lesion:  •  CKD:  [ ] Stage I   [ ] Stage II  [ ] Stage III    [ ]Stage IV   [ ]  CKD V   [ ]  Other/Unspecified:    •  Abdominal Nutritional Status:   [ ] Malnutrition-See Nutrition note    [ ] Cachexia   [ ]  Other        [ ] Supplement ordered:            [ ] Morbid Obesity: BMI >=40         ASSESSMENT/PLAN:      STATUS POST: pod2 L hip hemiarthroplasty  hypotensive with PT.  NS bolus 250cc.  Assisted to drink po fluids  pain control  bowel regimen  med c/s recommends to keep one more day to trend labs and encourage hydration  CONTINUE:          [ ] PT/OT- wbat, posterior hip precautions    [ ] DVT PPX- scd    [ ] Pain Mgt- po meds    [ ] Dispo plan- acute rehabilitation, if denied then [facility]

## 2018-07-10 NOTE — PROGRESS NOTE ADULT - SUBJECTIVE AND OBJECTIVE BOX
S: No acute events overnight.    Vital Signs Last 24 Hrs  T(C): 36.8 (10 Jul 2018 04:53), Max: 37.1 (09 Jul 2018 17:11)  T(F): 98.2 (10 Jul 2018 04:53), Max: 98.7 (09 Jul 2018 17:11)  HR: 63 (10 Jul 2018 04:53) (63 - 74)  BP: 107/58 (10 Jul 2018 04:53) (92/50 - 127/63)  BP(mean): --  RR: 15 (10 Jul 2018 04:53) (15 - 16)  SpO2: 100% (10 Jul 2018 04:53) (100% - 100%)  I&O's Summary    08 Jul 2018 07:01  -  09 Jul 2018 07:00  --------------------------------------------------------  IN: 1940 mL / OUT: 880 mL / NET: 1060 mL    09 Jul 2018 07:01  -  10 Jul 2018 06:23  --------------------------------------------------------  IN: 130 mL / OUT: 150 mL / NET: -20 mL        Dressing CDI  Motor: 5/5 GS/TA/EHL/FHL    Sensation: SILT sural, saph, DP, SP and tibial n distribution  Pulses: WWP, DP/PT 2+                            11.1   9.1   )-----------( 216      ( 09 Jul 2018 07:04 )             34.7     07-09    133<L>  |  99  |  25<H>  ----------------------------<  107<H>  3.7   |  21<L>  |  0.72    Ca    8.7      09 Jul 2018 07:04  Phos  4.2     07-09  Mg     2.1     07-09        MEDICATIONS  (STANDING):  acetaminophen   Tablet. 975 milliGRAM(s) Oral every 8 hours  acetazolamide    Tablet 250 milliGRAM(s) Oral two times a day  apixaban 2.5 milliGRAM(s) Oral every 12 hours  bimatoprost 0.01% Ophthalmic Solution 1 Drop(s) Both EYES at bedtime  cholecalciferol 1000 Unit(s) Oral daily  docusate sodium 100 milliGRAM(s) Oral three times a day  docusate sodium 100 milliGRAM(s) Oral three times a day  metoprolol succinate ER 50 milliGRAM(s) Oral two times a day  multivitamin 1 Tablet(s) Oral daily  polyethylene glycol 3350 17 Gram(s) Oral daily  polyethylene glycol 3350 17 Gram(s) Oral daily  senna 2 Tablet(s) Oral at bedtime  thyroid 30 milliGRAM(s) Oral daily    MEDICATIONS  (PRN):  acetaminophen   Tablet 650 milliGRAM(s) Oral every 6 hours PRN For Temp greater than 38 C (100.4 F)  aluminum hydroxide/magnesium hydroxide/simethicone Suspension 30 milliLiter(s) Oral four times a day PRN Indigestion  bisacodyl Suppository 10 milliGRAM(s) Rectal daily PRN If no bowel movement by POD#2  metoclopramide Injectable 10 milliGRAM(s) IV Push every 6 hours PRN Nausea and/or Vomiting  morphine  - Injectable 2 milliGRAM(s) IV Push every 4 hours PRN breakthrough pain  ondansetron Injectable 4 milliGRAM(s) IV Push every 6 hours PRN Nausea and/or Vomiting  senna 2 Tablet(s) Oral at bedtime PRN Constipation  traMADol 25 milliGRAM(s) Oral every 4 hours PRN Moderate Pain (4 - 6)  traMADol 50 milliGRAM(s) Oral every 4 hours PRN Severe Pain (7 - 10)      A/P:  89y Female s/p L Hip Mohsen, POD 2  -Stable  -Pain Control  -PT/WBAT  -DVT ppx: Eliquis  -f/u AM labs  -Dispo: [facility]

## 2018-07-10 NOTE — PROGRESS NOTE ADULT - PROBLEM SELECTOR PLAN 1
S/p Left hip arthroplasty.  POD#2  - Patient's RCRI is 0 which puts her at 0.4% risk of major cardiac event.  Patient METS are between 4 and 10.  Tolerated procedure well.  No events on telemetry  - Eliquis restarted on 7/9  - C/w monitoring Hgb in setting of downtrend w/ anticoagulation restarted.

## 2018-07-10 NOTE — PROGRESS NOTE ADULT - SUBJECTIVE AND OBJECTIVE BOX
Patient is a 89y old  Female who presents with a chief complaint of Left femoral neck fracture (09 Jul 2018 09:17)    INTERVAL HPI/OVERNIGHT EVENTS: No acute events.  Eating breakfast upon examination.  Denies left hip pain.  Has been out of bed.  Overall feeling well.  Denies CP/SOB, n/v.  12 point ROS otherwise negative.    VITALS  Vital Signs Last 24 Hrs  T(C): 36.8 (10 Jul 2018 04:53), Max: 37.1 (09 Jul 2018 17:11)  T(F): 98.2 (10 Jul 2018 04:53), Max: 98.7 (09 Jul 2018 17:11)  HR: 63 (10 Jul 2018 04:53) (63 - 74)  BP: 107/58 (10 Jul 2018 04:53) (107/58 - 127/63)  BP(mean): --  RR: 15 (10 Jul 2018 04:53) (15 - 16)  SpO2: 100% (10 Jul 2018 04:53) (100% - 100%)    I&O's Summary    09 Jul 2018 07:01  -  10 Jul 2018 07:00  --------------------------------------------------------  IN: 130 mL / OUT: 150 mL / NET: -20 mL    10 Jul 2018 07:01  -  10 Jul 2018 10:55  --------------------------------------------------------  IN: 0 mL / OUT: 250 mL / NET: -250 mL      PHYSICAL EXAM  General: NAD; comfortable  HEENT: NC/AT, supple neck, MMM, no JVD  Respiratory: CTA B/L; no wheezes/crackles w/ good air movement  Cardiovascular: Regular rhythm/rate; S1/S2  Gastrointestinal: Soft; NTND; bowel sounds normal  Extremities: WWP; no clubbing; radial/pedal pulses palpable; left hip swelling, bandage at wound site w/ minimal drainage, minimally tender to palpation  Neurological:  A&Ox3; PERRL; EOMI; CNII-XII grossly intact; 5/5 strength; sensation intact; reflexes 2+; no obvious focal deficits  Skin: No rashes or ulcers, normal tugor    Consultant(s) Notes Reviewed:  [x ] YES  [ ] NO  Care Discussed with Consultants/Other Providers [ x] YES  [ ] NO    LABS:                        11.1   9.1   )-----------( 216      ( 09 Jul 2018 07:04 )             34.7     07-09    133<L>  |  99  |  25<H>  ----------------------------<  107<H>  3.7   |  21<L>  |  0.72    Ca    8.7      09 Jul 2018 07:04  Phos  4.2     07-09  Mg     2.1     07-09    CXR: Mild pulm congestion, no infiltrates

## 2018-07-10 NOTE — PROGRESS NOTE ADULT - PROBLEM SELECTOR PLAN 2
No symptoms of hypo or hyperthyroid at this time.    - C/w home synthroid Hyponatremia w/ decreasing bicarb w/out anion gap.  No clear etiology of possible metabolic alkalosis.  Possible compensation for pain/hyperventilation but patient appears comfortable.  Patient appears dry.  - Recommend 250 cc bolus of IVF  - Repeat AM labs prior to D/C -- if bicarb worsens, will obtain blood gas and further w/u

## 2018-07-10 NOTE — OCCUPATIONAL THERAPY INITIAL EVALUATION ADULT - MD ORDER
Per chart, 89 year old woman who fell and sustained a L femoral neck fracture yesterday afternoon. She has a history of a fib, on eliquis (last dose 1700 7/5). Head CT negative. Denies LOC, dizziness, bleeding.

## 2018-07-10 NOTE — PROGRESS NOTE ADULT - SUBJECTIVE AND OBJECTIVE BOX
Sandy Fishman is a 89 year old F patient who had a left hip hemiarthroplasty posterior approach on 7/8/18 s/p left femoral neck fracture.  It is medically necessary for her to be transported to rehabilitation in an ambulance in order to maintain posterior hip precautions with her abduction pillow in place.    Ines Romero, DOUGLAS-BC  NPI: [id number]

## 2018-07-10 NOTE — OCCUPATIONAL THERAPY INITIAL EVALUATION ADULT - GENERAL OBSERVATIONS, REHAB EVAL
Right hand dominant. Chart reviewed, patient cleared for OT eval by GHAZAL Butterfield. Received semi-supine, NAD, +SCDs, +abduction pillow, +heplock, denies pain.

## 2018-07-10 NOTE — OCCUPATIONAL THERAPY INITIAL EVALUATION ADULT - PLANNED THERAPY INTERVENTIONS, OT EVAL
transfer training/IADL retraining/parent/caregiver training.../strengthening/ADL retraining/balance training/bed mobility training

## 2018-07-10 NOTE — PROGRESS NOTE ADULT - SUBJECTIVE AND OBJECTIVE BOX
Chief Complaint/Reason for Consult: periop cv mgmt  INTERVAL HPI: Denies any lightheadedness, CP, SOB, dizziness, or nausea.   	  MEDICATIONS:  acetazolamide    Tablet 250 milliGRAM(s) Oral two times a day  metoprolol succinate ER 50 milliGRAM(s) Oral two times a day        acetaminophen   Tablet 650 milliGRAM(s) Oral every 6 hours PRN  acetaminophen   Tablet. 975 milliGRAM(s) Oral every 8 hours  metoclopramide Injectable 10 milliGRAM(s) IV Push every 6 hours PRN  morphine  - Injectable 2 milliGRAM(s) IV Push every 4 hours PRN  ondansetron Injectable 4 milliGRAM(s) IV Push every 6 hours PRN  traMADol 25 milliGRAM(s) Oral every 4 hours PRN  traMADol 50 milliGRAM(s) Oral every 4 hours PRN    aluminum hydroxide/magnesium hydroxide/simethicone Suspension 30 milliLiter(s) Oral four times a day PRN  bisacodyl Suppository 10 milliGRAM(s) Rectal daily PRN  docusate sodium 100 milliGRAM(s) Oral three times a day  docusate sodium 100 milliGRAM(s) Oral three times a day  polyethylene glycol 3350 17 Gram(s) Oral daily  polyethylene glycol 3350 17 Gram(s) Oral daily  senna 2 Tablet(s) Oral at bedtime  senna 2 Tablet(s) Oral at bedtime PRN    thyroid 30 milliGRAM(s) Oral daily    apixaban 2.5 milliGRAM(s) Oral every 12 hours  bimatoprost 0.01% Ophthalmic Solution 1 Drop(s) Both EYES at bedtime  cholecalciferol 1000 Unit(s) Oral daily  multivitamin 1 Tablet(s) Oral daily      REVIEW OF SYSTEMS:  [x] As per HPI  CONSTITUTIONAL: No fever, weight loss, or fatigue  RESPIRATORY: No cough, wheezing, chills or hemoptysis; No Shortness of Breath  CARDIOVASCULAR: No chest pain, palpitations, dizziness, or leg swelling  GASTROINTESTINAL: No abdominal or epigastric pain. No nausea, vomiting, or hematemesis; No diarrhea or constipation. No melena or hematochezia.  MUSCULOSKELETAL: No joint pain or swelling; No muscle, back, or extremity pain  [x] All others negative	  [ ] Unable to obtain    PHYSICAL EXAM:  T(C): 36.8 (07-10-18 @ 04:53), Max: 37.1 (07-09-18 @ 17:11)  HR: 63 (07-10-18 @ 04:53) (63 - 74)  BP: 107/58 (07-10-18 @ 04:53) (92/50 - 127/63)  RR: 15 (07-10-18 @ 04:53) (15 - 16)  SpO2: 100% (07-10-18 @ 04:53) (100% - 100%)  Wt(kg): --  I&O's Summary    09 Jul 2018 07:01  -  10 Jul 2018 07:00  --------------------------------------------------------  IN: 130 mL / OUT: 150 mL / NET: -20 mL          Appearance: Normal	  HEENT:   Normal oral mucosa  Cardiovascular: Normal S1 S2, No JVD, No murmurs, No edema  Respiratory: Lungs clear to auscultation	  Gastrointestinal:  Soft, Non-tender, + BS	  Extremities: Normal range of motion, No clubbing, cyanosis or edema  Vascular: Peripheral pulses palpable 2+ bilaterally    TELEMETRY: 	    ECG:   	  RADIOLOGY:   CXR:  CT:  US:    CARDIAC TESTING:  Echocardiogram:  Catheterization:  Stress Test:      LABS:	 	    CARDIAC MARKERS:                                  11.1   9.1   )-----------( 216      ( 09 Jul 2018 07:04 )             34.7     07-09    133<L>  |  99  |  25<H>  ----------------------------<  107<H>  3.7   |  21<L>  |  0.72    Ca    8.7      09 Jul 2018 07:04  Phos  4.2     07-09  Mg     2.1     07-09      proBNP:   Lipid Profile:   HgA1c:   TSH:     ASSESSMENT/PLAN: 	    # post op s complications, no rvr on tele no pauses    Afib on Eliquis-EKG currently NSR. Likely paroxysmal. Resume eliquis. [id number]=1.     Aortic Stenosis-Patient with AS on echo. Valve area 1.6 with peak gradient 33mmHg. Moderate AS. EF 55-60%  -Be cautious with with perioperative IVF.    HTN-  Continue with Metoprolol.

## 2018-07-10 NOTE — OCCUPATIONAL THERAPY INITIAL EVALUATION ADULT - VISUAL ACUITY
+glasses. 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

## 2018-07-10 NOTE — PROGRESS NOTE ADULT - SUBJECTIVE AND OBJECTIVE BOX
ORTHO NOTE    [ ] Pt seen/examined.  [ ] Pt without any complaints/in NAD.    [ ] Pt complains of:      ROS: [ ] Fever  [ ] Chills  [ ] CP [ ] SOB [ ] Dysnea  [ ] Palpitations [ ] Cough [ ] N/V/C/D [ ] Paresthia [ ] Other     [ ] ROS  otherwise negative    .    PHYSICAL EXAM:    Vital Signs Last 24 Hrs  T(C): 36.8 (10 Jul 2018 04:53), Max: 37.1 (09 Jul 2018 17:11)  T(F): 98.2 (10 Jul 2018 04:53), Max: 98.7 (09 Jul 2018 17:11)  HR: 63 (10 Jul 2018 04:53) (63 - 74)  BP: 107/58 (10 Jul 2018 04:53) (107/58 - 127/63)  BP(mean): --  RR: 15 (10 Jul 2018 04:53) (15 - 16)  SpO2: 100% (10 Jul 2018 04:53) (100% - 100%)    I&O's Detail    09 Jul 2018 07:01  -  10 Jul 2018 07:00  --------------------------------------------------------  IN:    IV PiggyBack: 50 mL    lactated ringers.: 80 mL  Total IN: 130 mL    OUT:    Voided: 150 mL  Total OUT: 150 mL    Total NET: -20 mL      10 Jul 2018 07:01  -  10 Jul 2018 10:52  --------------------------------------------------------  IN:  Total IN: 0 mL    OUT:    Voided: 250 mL  Total OUT: 250 mL    Total NET: -250 mL           CAPILLARY BLOOD GLUCOSE                      Neuro:    Lungs:    CV:    ABD:     Ext: Dressing CDI  Motor: 5/5 GS/TA/EHL/FHL    Sensation: SILT sural, saph, DP, SP and tibial n distribution  Pulses: WWP, DP/PT 2+     LABS                        11.1   9.1   )-----------( 216      ( 09 Jul 2018 07:04 )             34.7                                07-09    133<L>  |  99  |  25<H>  ----------------------------<  107<H>  3.7   |  21<L>  |  0.72    Ca    8.7      09 Jul 2018 07:04  Phos  4.2     07-09  Mg     2.1     07-09        [ ] Other Labs  [ ] None ordered            Please check or [place] when present:  •  Heart Failure:    [ ] Acute        [ ]  Acute on Chronic        [ ] Chronic         [ ] Diastolic     [ ]  Combined    •  PEREZ:     [ ] ATN        [ ]  Renal medullary necrosis       [ ]  Renal cortical necrosis                  [ ] Other pathological Lesion:  •  CKD:  [ ] Stage I   [ ] Stage II  [ ] Stage III    [ ]Stage IV   [ ]  CKD V   [ ]  Other/Unspecified:    •  Abdominal Nutritional Status:   [ ] Malnutrition-See Nutrition note    [ ] Cachexia   [ ]  Other        [ ] Supplement ordered:            [ ] Morbid Obesity: BMI >=40         ASSESSMENT/PLAN:      STATUS POST:     CONTINUE:          [ ] PT    [ ] DVT PPX-    [ ] Pain Mgt    [ ] Dispo plan-        Dressing CDI  Motor: 5/5 GS/TA/EHL/FHL    Sensation: SILT sural, saph, DP, SP and tibial n distribution  Pulses: WWP, DP/PT 2+

## 2018-07-10 NOTE — PROGRESS NOTE ADULT - PROBLEM SELECTOR PLAN 3
Cardiology following.  Rate controlled.  On A/C.  - C/w regimen as per cards recommendations. No symptoms of hypo or hyperthyroid at this time.    - C/w home synthroid

## 2018-07-11 LAB
ANION GAP SERPL CALC-SCNC: 13 MMOL/L — SIGNIFICANT CHANGE UP (ref 5–17)
BUN SERPL-MCNC: 23 MG/DL — SIGNIFICANT CHANGE UP (ref 7–23)
CALCIUM SERPL-MCNC: 9.4 MG/DL — SIGNIFICANT CHANGE UP (ref 8.4–10.5)
CHLORIDE SERPL-SCNC: 101 MMOL/L — SIGNIFICANT CHANGE UP (ref 96–108)
CO2 SERPL-SCNC: 18 MMOL/L — LOW (ref 22–31)
CREAT SERPL-MCNC: 0.67 MG/DL — SIGNIFICANT CHANGE UP (ref 0.5–1.3)
GLUCOSE SERPL-MCNC: 117 MG/DL — HIGH (ref 70–99)
HCT VFR BLD CALC: 31.9 % — LOW (ref 34.5–45)
HGB BLD-MCNC: 10.1 G/DL — LOW (ref 11.5–15.5)
MCHC RBC-ENTMCNC: 28.5 PG — SIGNIFICANT CHANGE UP (ref 27–34)
MCHC RBC-ENTMCNC: 31.7 G/DL — LOW (ref 32–36)
MCV RBC AUTO: 89.9 FL — SIGNIFICANT CHANGE UP (ref 80–100)
PLATELET # BLD AUTO: 229 K/UL — SIGNIFICANT CHANGE UP (ref 150–400)
POTASSIUM SERPL-MCNC: 3.9 MMOL/L — SIGNIFICANT CHANGE UP (ref 3.5–5.3)
POTASSIUM SERPL-SCNC: 3.9 MMOL/L — SIGNIFICANT CHANGE UP (ref 3.5–5.3)
RBC # BLD: 3.55 M/UL — LOW (ref 3.8–5.2)
RBC # FLD: 13.6 % — SIGNIFICANT CHANGE UP (ref 10.3–16.9)
SODIUM SERPL-SCNC: 132 MMOL/L — LOW (ref 135–145)
WBC # BLD: 9.2 K/UL — SIGNIFICANT CHANGE UP (ref 3.8–10.5)
WBC # FLD AUTO: 9.2 K/UL — SIGNIFICANT CHANGE UP (ref 3.8–10.5)

## 2018-07-11 PROCEDURE — 99232 SBSQ HOSP IP/OBS MODERATE 35: CPT

## 2018-07-11 PROCEDURE — 99233 SBSQ HOSP IP/OBS HIGH 50: CPT | Mod: GC

## 2018-07-11 RX ORDER — SODIUM CHLORIDE 9 MG/ML
250 INJECTION INTRAMUSCULAR; INTRAVENOUS; SUBCUTANEOUS ONCE
Qty: 0 | Refills: 0 | Status: COMPLETED | OUTPATIENT
Start: 2018-07-11 | End: 2018-07-11

## 2018-07-11 RX ADMIN — Medication 975 MILLIGRAM(S): at 19:07

## 2018-07-11 RX ADMIN — Medication 100 MILLIGRAM(S): at 21:47

## 2018-07-11 RX ADMIN — ACETAZOLAMIDE 250 MILLIGRAM(S): 250 TABLET ORAL at 19:07

## 2018-07-11 RX ADMIN — Medication 975 MILLIGRAM(S): at 00:47

## 2018-07-11 RX ADMIN — Medication 1000 UNIT(S): at 15:44

## 2018-07-11 RX ADMIN — Medication 975 MILLIGRAM(S): at 09:34

## 2018-07-11 RX ADMIN — Medication 975 MILLIGRAM(S): at 10:06

## 2018-07-11 RX ADMIN — SODIUM CHLORIDE 500 MILLILITER(S): 9 INJECTION INTRAMUSCULAR; INTRAVENOUS; SUBCUTANEOUS at 16:05

## 2018-07-11 RX ADMIN — APIXABAN 2.5 MILLIGRAM(S): 2.5 TABLET, FILM COATED ORAL at 06:15

## 2018-07-11 RX ADMIN — ACETAZOLAMIDE 250 MILLIGRAM(S): 250 TABLET ORAL at 06:15

## 2018-07-11 RX ADMIN — POLYETHYLENE GLYCOL 3350 17 GRAM(S): 17 POWDER, FOR SOLUTION ORAL at 12:49

## 2018-07-11 RX ADMIN — SENNA PLUS 2 TABLET(S): 8.6 TABLET ORAL at 21:47

## 2018-07-11 RX ADMIN — Medication 100 MILLIGRAM(S): at 15:44

## 2018-07-11 RX ADMIN — Medication 50 MILLIGRAM(S): at 19:06

## 2018-07-11 RX ADMIN — APIXABAN 2.5 MILLIGRAM(S): 2.5 TABLET, FILM COATED ORAL at 19:08

## 2018-07-11 RX ADMIN — Medication 30 MILLIGRAM(S): at 06:16

## 2018-07-11 RX ADMIN — Medication 50 MILLIGRAM(S): at 06:15

## 2018-07-11 RX ADMIN — Medication 975 MILLIGRAM(S): at 01:40

## 2018-07-11 RX ADMIN — Medication 1 TABLET(S): at 12:49

## 2018-07-11 NOTE — PROGRESS NOTE ADULT - PROBLEM SELECTOR PLAN 1
S/p Left hip arthroplasty.  POD#2  - Patient's RCRI is 0 which puts her at 0.4% risk of major cardiac event.  Patient METS are between 4 and 10.  Tolerated procedure well.  No events on telemetry  - Eliquis restarted on 7/9  - C/w monitoring Hgb in setting of downtrend w/ anticoagulation restarted. S/p Left hip arthroplasty.  POD#2  - Patient's RCRI is 0 which puts her at 0.4% risk of major cardiac event.  Patient METS are between 4 and 10.  Tolerated procedure well.  No events on telemetry  - Eliquis restarted on 7/9  - Hgb stable  - Pt due for d/c to rehab today S/p Left hip arthroplasty.  POD#2  - Patient's RCRI is 0 which puts her at 0.4% risk of major cardiac event.  Patient METS are between 4 and 10.  Tolerated procedure well.  No events on telemetry  - Eliquis restarted on 7/9  - Hgb stable  - Pt due for d/c to rehab today, medically stable for discharge. S/p Left hip arthroplasty.  POD#3  - Patient's RCRI is 0 which puts her at 0.4% risk of major cardiac event.  Patient METS are between 4 and 10.  Tolerated procedure well.  No events on telemetry  - Eliquis restarted on 7/9  - Hgb stable  - Pt due for d/c to rehab today, medically stable for discharge.

## 2018-07-11 NOTE — PROGRESS NOTE ADULT - ASSESSMENT
89F w/ hypertension, atrial fibrillation (on eliquis), hypothyroidism, glaucoma, and retinitis pigmentosa who presents with L femoral neck fracture, S/p left hip kim-arthoplasty, POD#2. 89F w/ hypertension, atrial fibrillation (on eliquis), hypothyroidism, glaucoma, and retinitis pigmentosa who presents with L femoral neck fracture, S/p left hip kim-arthoplasty, POD#3.

## 2018-07-11 NOTE — PROGRESS NOTE ADULT - SUBJECTIVE AND OBJECTIVE BOX
Chief Complaint/Reason for Consult: periop cv mgmt  INTERVAL HPI: Denies any lightheadedness, CP, SOB, dizziness, or nausea.   	  MEDICATIONS:  acetazolamide    Tablet 250 milliGRAM(s) Oral two times a day  metoprolol succinate ER 50 milliGRAM(s) Oral two times a day        acetaminophen   Tablet 650 milliGRAM(s) Oral every 6 hours PRN  acetaminophen   Tablet. 975 milliGRAM(s) Oral every 8 hours  metoclopramide Injectable 10 milliGRAM(s) IV Push every 6 hours PRN  morphine  - Injectable 2 milliGRAM(s) IV Push every 4 hours PRN  ondansetron Injectable 4 milliGRAM(s) IV Push every 6 hours PRN  traMADol 25 milliGRAM(s) Oral every 4 hours PRN  traMADol 50 milliGRAM(s) Oral every 4 hours PRN    aluminum hydroxide/magnesium hydroxide/simethicone Suspension 30 milliLiter(s) Oral four times a day PRN  bisacodyl Suppository 10 milliGRAM(s) Rectal daily PRN  docusate sodium 100 milliGRAM(s) Oral three times a day  docusate sodium 100 milliGRAM(s) Oral three times a day  polyethylene glycol 3350 17 Gram(s) Oral daily  polyethylene glycol 3350 17 Gram(s) Oral daily  senna 2 Tablet(s) Oral at bedtime  senna 2 Tablet(s) Oral at bedtime PRN    thyroid 30 milliGRAM(s) Oral daily    apixaban 2.5 milliGRAM(s) Oral every 12 hours  bimatoprost 0.01% Ophthalmic Solution 1 Drop(s) Both EYES at bedtime  cholecalciferol 1000 Unit(s) Oral daily  multivitamin 1 Tablet(s) Oral daily  sodium chloride 0.9% Bolus 250 milliLiter(s) IV Bolus once      REVIEW OF SYSTEMS:  [x] As per HPI  CONSTITUTIONAL: No fever, weight loss, or fatigue  RESPIRATORY: No cough, wheezing, chills or hemoptysis; No Shortness of Breath  CARDIOVASCULAR: No chest pain, palpitations, dizziness, or leg swelling  GASTROINTESTINAL: No abdominal or epigastric pain. No nausea, vomiting, or hematemesis; No diarrhea or constipation. No melena or hematochezia.  MUSCULOSKELETAL: No joint pain or swelling; No muscle, back, or extremity pain  [x] All others negative	  [ ] Unable to obtain    PHYSICAL EXAM:  T(C): 36.3 (07-11-18 @ 04:58), Max: 36.9 (07-10-18 @ 17:46)  HR: 67 (07-11-18 @ 04:58) (67 - 71)  BP: 118/73 (07-11-18 @ 04:58) (102/50 - 118/73)  RR: 16 (07-11-18 @ 04:58) (15 - 17)  SpO2: 96% (07-11-18 @ 04:58) (96% - 98%)  Wt(kg): --  I&O's Summary    10 Jul 2018 07:01  -  11 Jul 2018 07:00  --------------------------------------------------------  IN: 1190 mL / OUT: 750 mL / NET: 440 mL          Appearance: Normal	  HEENT:   Normal oral mucosa  Cardiovascular: Normal S1 S2, No JVD, No murmurs, No edema  Respiratory: Lungs clear to auscultation	  Gastrointestinal:  Soft, Non-tender, + BS	  Extremities: Normal range of motion, No clubbing, cyanosis or edema  Vascular: Peripheral pulses palpable 2+ bilaterally    TELEMETRY: 	    ECG:   	  RADIOLOGY:   CXR:  CT:  US:    CARDIAC TESTING:  Echocardiogram:  Catheterization:  Stress Test:      LABS:	 	    CARDIAC MARKERS:                                  10.1   9.2   )-----------( 229      ( 11 Jul 2018 05:59 )             31.9     07-11    132<L>  |  101  |  23  ----------------------------<  117<H>  3.9   |  18<L>  |  0.67    Ca    9.4      11 Jul 2018 05:59  Phos  3.3     07-10  Mg     2.2     07-10      proBNP:   Lipid Profile:   HgA1c:   TSH:     ASSESSMENT/PLAN: 	    # post op s complications    Afib on Eliquis-EKG currently NSR. Likely paroxysmal. Resume eliquis. [id number].     Aortic Stenosis-Patient with AS on echo. Valve area 1.6 with peak gradient 33mmHg. Moderate AS. EF 55-60%  -Be cautious with with perioperative IVF.    HTN-  Continue with Metoprolol.

## 2018-07-11 NOTE — PROGRESS NOTE ADULT - SUBJECTIVE AND OBJECTIVE BOX
O/N Events:  Subjective/ROS: Denies HA, CP, SOB, n/v, changes in bowel/urinary habits.  12pt ROS otherwise negative.    VITALS  Vital Signs Last 24 Hrs  T(C): 36.3 (11 Jul 2018 04:58), Max: 36.9 (10 Jul 2018 17:46)  T(F): 97.3 (11 Jul 2018 04:58), Max: 98.5 (11 Jul 2018 00:51)  HR: 67 (11 Jul 2018 04:58) (67 - 74)  BP: 118/73 (11 Jul 2018 04:58) (88/51 - 118/73)  BP(mean): --  RR: 16 (11 Jul 2018 04:58) (15 - 17)  SpO2: 96% (11 Jul 2018 04:58) (96% - 100%)    CAPILLARY BLOOD GLUCOSE          PHYSICAL EXAM  General: A&Ox3; NAD  Head: NC/AT; MMM; PERRL; EOMI;  Neck: Supple; no JVD  Respiratory: CTA B/L; no wheezes/crackles   Cardiovascular: Regular rhythm/rate; S1/S2   Gastrointestinal: Soft; NTND; normoactive BS  Extremities: WWP; no edema/cyanosis  Neurological:  CNII-XII grossly intact; no obvious focal deficits    MEDICATIONS  (STANDING):  acetaminophen   Tablet. 975 milliGRAM(s) Oral every 8 hours  acetazolamide    Tablet 250 milliGRAM(s) Oral two times a day  apixaban 2.5 milliGRAM(s) Oral every 12 hours  bimatoprost 0.01% Ophthalmic Solution 1 Drop(s) Both EYES at bedtime  cholecalciferol 1000 Unit(s) Oral daily  docusate sodium 100 milliGRAM(s) Oral three times a day  docusate sodium 100 milliGRAM(s) Oral three times a day  metoprolol succinate ER 50 milliGRAM(s) Oral two times a day  multivitamin 1 Tablet(s) Oral daily  polyethylene glycol 3350 17 Gram(s) Oral daily  polyethylene glycol 3350 17 Gram(s) Oral daily  senna 2 Tablet(s) Oral at bedtime  thyroid 30 milliGRAM(s) Oral daily    MEDICATIONS  (PRN):  acetaminophen   Tablet 650 milliGRAM(s) Oral every 6 hours PRN For Temp greater than 38 C (100.4 F)  aluminum hydroxide/magnesium hydroxide/simethicone Suspension 30 milliLiter(s) Oral four times a day PRN Indigestion  bisacodyl Suppository 10 milliGRAM(s) Rectal daily PRN If no bowel movement by POD#2  metoclopramide Injectable 10 milliGRAM(s) IV Push every 6 hours PRN Nausea and/or Vomiting  morphine  - Injectable 2 milliGRAM(s) IV Push every 4 hours PRN breakthrough pain  ondansetron Injectable 4 milliGRAM(s) IV Push every 6 hours PRN Nausea and/or Vomiting  senna 2 Tablet(s) Oral at bedtime PRN Constipation  traMADol 25 milliGRAM(s) Oral every 4 hours PRN Moderate Pain (4 - 6)  traMADol 50 milliGRAM(s) Oral every 4 hours PRN Severe Pain (7 - 10)      No Known Allergies      LABS                        10.1   9.2   )-----------( 229      ( 11 Jul 2018 05:59 )             31.9     07-11    132<L>  |  101  |  23  ----------------------------<  117<H>  3.9   |  18<L>  |  0.67    Ca    9.4      11 Jul 2018 05:59  Phos  3.3     07-10  Mg     2.2     07-10                IMAGING/EKG/ETC  EKG:  Xray:  CT:  MRI: O/N Events: PAULA  Subjective/ROS: Denies HA, CP, SOB, n/v, changes in bowel/urinary habits.  12pt ROS otherwise negative.  Having regular bowel movements.  Working w/ PT.    VITALS  Vital Signs Last 24 Hrs  T(C): 36.3 (11 Jul 2018 04:58), Max: 36.9 (10 Jul 2018 17:46)  T(F): 97.3 (11 Jul 2018 04:58), Max: 98.5 (11 Jul 2018 00:51)  HR: 67 (11 Jul 2018 04:58) (67 - 74)  BP: 118/73 (11 Jul 2018 04:58) (88/51 - 118/73)  BP(mean): --  RR: 16 (11 Jul 2018 04:58) (15 - 17)  SpO2: 96% (11 Jul 2018 04:58) (96% - 100%)    PHYSICAL EXAM  General: A&Ox3; NAD  Head: NC/AT; dry; PERRL; EOMI;  Neck: Supple; no JVD  Respiratory: CTA B/L; no wheezes/crackles   Cardiovascular: Regular rhythm/rate; S1/S2   Gastrointestinal: Soft; NTND; normoactive BS  Extremities: WWP; no edema/cyanosis; left hip swollen but w/out ecchymosis, minimally tender to palpation  Neurological:  legally blind, CNIII-XII grossly intact; no obvious focal deficits    MEDICATIONS  (STANDING):  acetaminophen   Tablet. 975 milliGRAM(s) Oral every 8 hours  acetazolamide    Tablet 250 milliGRAM(s) Oral two times a day  apixaban 2.5 milliGRAM(s) Oral every 12 hours  bimatoprost 0.01% Ophthalmic Solution 1 Drop(s) Both EYES at bedtime  cholecalciferol 1000 Unit(s) Oral daily  docusate sodium 100 milliGRAM(s) Oral three times a day  docusate sodium 100 milliGRAM(s) Oral three times a day  metoprolol succinate ER 50 milliGRAM(s) Oral two times a day  multivitamin 1 Tablet(s) Oral daily  polyethylene glycol 3350 17 Gram(s) Oral daily  polyethylene glycol 3350 17 Gram(s) Oral daily  senna 2 Tablet(s) Oral at bedtime  thyroid 30 milliGRAM(s) Oral daily    MEDICATIONS  (PRN):  acetaminophen   Tablet 650 milliGRAM(s) Oral every 6 hours PRN For Temp greater than 38 C (100.4 F)  aluminum hydroxide/magnesium hydroxide/simethicone Suspension 30 milliLiter(s) Oral four times a day PRN Indigestion  bisacodyl Suppository 10 milliGRAM(s) Rectal daily PRN If no bowel movement by POD#2  metoclopramide Injectable 10 milliGRAM(s) IV Push every 6 hours PRN Nausea and/or Vomiting  morphine  - Injectable 2 milliGRAM(s) IV Push every 4 hours PRN breakthrough pain  ondansetron Injectable 4 milliGRAM(s) IV Push every 6 hours PRN Nausea and/or Vomiting  senna 2 Tablet(s) Oral at bedtime PRN Constipation  traMADol 25 milliGRAM(s) Oral every 4 hours PRN Moderate Pain (4 - 6)  traMADol 50 milliGRAM(s) Oral every 4 hours PRN Severe Pain (7 - 10)      No Known Allergies      LABS                        10.1   9.2   )-----------( 229      ( 11 Jul 2018 05:59 )             31.9     07-11    132<L>  |  101  |  23  ----------------------------<  117<H>  3.9   |  18<L>  |  0.67    Ca    9.4      11 Jul 2018 05:59  Phos  3.3     07-10  Mg     2.2     07-10    IMAGING/EKG/ETC: Reviewed

## 2018-07-11 NOTE — PROGRESS NOTE ADULT - SUBJECTIVE AND OBJECTIVE BOX
S: No acute events overnight. No shortness of breath or CP. Walking to bathroom twice.     Vital Signs Last 24 Hrs  T(C): 36.3 (11 Jul 2018 04:58), Max: 36.9 (10 Jul 2018 17:46)  T(F): 97.3 (11 Jul 2018 04:58), Max: 98.5 (11 Jul 2018 00:51)  HR: 67 (11 Jul 2018 04:58) (67 - 74)  BP: 118/73 (11 Jul 2018 04:58) (88/51 - 118/73)  BP(mean): --  RR: 16 (11 Jul 2018 04:58) (15 - 17)  SpO2: 96% (11 Jul 2018 04:58) (96% - 100%)  I&O's Summary    09 Jul 2018 07:01  -  10 Jul 2018 07:00  --------------------------------------------------------  IN: 130 mL / OUT: 150 mL / NET: -20 mL    10 Jul 2018 07:01  -  11 Jul 2018 05:44  --------------------------------------------------------  IN: 1040 mL / OUT: 750 mL / NET: 290 mL        R Hip dressing CDI  Motor: 5/5 GS/TA/EHL/FHL    Sensation: SILT sural, saph, DP, SP and tibial n distribution  Pulses: WWP, DP/PT 2+                            11.0   11.7  )-----------( 241      ( 10 Jul 2018 10:45 )             35.7     07-10    131<L>  |  97  |  26<H>  ----------------------------<  148<H>  3.4<L>   |  18<L>  |  0.71    Ca    9.1      10 Jul 2018 10:45  Phos  3.3     07-10  Mg     2.2     07-10        MEDICATIONS  (STANDING):  acetaminophen   Tablet. 975 milliGRAM(s) Oral every 8 hours  acetazolamide    Tablet 250 milliGRAM(s) Oral two times a day  apixaban 2.5 milliGRAM(s) Oral every 12 hours  bimatoprost 0.01% Ophthalmic Solution 1 Drop(s) Both EYES at bedtime  cholecalciferol 1000 Unit(s) Oral daily  docusate sodium 100 milliGRAM(s) Oral three times a day  docusate sodium 100 milliGRAM(s) Oral three times a day  metoprolol succinate ER 50 milliGRAM(s) Oral two times a day  multivitamin 1 Tablet(s) Oral daily  polyethylene glycol 3350 17 Gram(s) Oral daily  polyethylene glycol 3350 17 Gram(s) Oral daily  senna 2 Tablet(s) Oral at bedtime  thyroid 30 milliGRAM(s) Oral daily    MEDICATIONS  (PRN):  acetaminophen   Tablet 650 milliGRAM(s) Oral every 6 hours PRN For Temp greater than 38 C (100.4 F)  aluminum hydroxide/magnesium hydroxide/simethicone Suspension 30 milliLiter(s) Oral four times a day PRN Indigestion  bisacodyl Suppository 10 milliGRAM(s) Rectal daily PRN If no bowel movement by POD#2  metoclopramide Injectable 10 milliGRAM(s) IV Push every 6 hours PRN Nausea and/or Vomiting  morphine  - Injectable 2 milliGRAM(s) IV Push every 4 hours PRN breakthrough pain  ondansetron Injectable 4 milliGRAM(s) IV Push every 6 hours PRN Nausea and/or Vomiting  senna 2 Tablet(s) Oral at bedtime PRN Constipation  traMADol 25 milliGRAM(s) Oral every 4 hours PRN Moderate Pain (4 - 6)  traMADol 50 milliGRAM(s) Oral every 4 hours PRN Severe Pain (7 - 10)      A/P:  89y Female s/p L Hip Mohsen POD 3.   -Stable  -Pain Control  -PT/WBAT  -DVT ppx: Eliquis  -Appreciate cards and med team recs  -f/u AM labs  -Dispo: Rehab

## 2018-07-11 NOTE — PROGRESS NOTE ADULT - SUBJECTIVE AND OBJECTIVE BOX
ORTHO NOTE    [x ] Pt seen/examined.  [x ] Pt without any complaints/in NAD.    [ ] Pt complains of:      ROS: [ ] Fever  [ ] Chills  [ ] CP [ ] SOB [ ] Dysnea  [ ] Palpitations [ ] Cough [ ] N/V/C/D [ ] Paresthia [ ] Other     [x ] ROS  otherwise negative    .    PHYSICAL EXAM:    Vital Signs Last 24 Hrs  T(C): 36.3 (11 Jul 2018 04:58), Max: 36.9 (10 Jul 2018 17:46)  T(F): 97.3 (11 Jul 2018 04:58), Max: 98.5 (11 Jul 2018 00:51)  HR: 67 (11 Jul 2018 04:58) (67 - 74)  BP: 118/73 (11 Jul 2018 04:58) (102/50 - 118/73)  BP(mean): --  RR: 16 (11 Jul 2018 04:58) (15 - 17)  SpO2: 96% (11 Jul 2018 04:58) (96% - 100%)    I&O's Detail    10 Jul 2018 07:01  -  11 Jul 2018 07:00  --------------------------------------------------------  IN:    Oral Fluid: 940 mL    Sodium Chloride 0.9% IV Bolus: 250 mL  Total IN: 1190 mL    OUT:    Voided: 750 mL  Total OUT: 750 mL    Total NET: 440 mL           CAPILLARY BLOOD GLUCOSE                      Neuro: AAOX3; 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    Lungs: nonlabored, Spo2 wnl, IS demonstrated    CV:    ABD: soft, nontender    Ext: left posterior hip aquacel cdi, abduction pillow implemented    LABS                        10.1   9.2   )-----------( 229      ( 11 Jul 2018 05:59 )             31.9                                07-11    132<L>  |  101  |  23  ----------------------------<  117<H>  3.9   |  18<L>  |  0.67    Ca    9.4      11 Jul 2018 05:59  Phos  3.3     07-10  Mg     2.2     07-10        [ ] Other Labs  [ ] None ordered            Please check or [place] when present:  •  Heart Failure:    [ ] Acute        [ ]  Acute on Chronic        [ ] Chronic         [ ] Diastolic     [ ]  Combined    •  PEREZ:     [ ] ATN        [ ]  Renal medullary necrosis       [ ]  Renal cortical necrosis                  [ ] Other pathological Lesion:  •  CKD:  [ ] Stage I   [ ] Stage II  [ ] Stage III    [ ]Stage IV   [ ]  CKD V   [ ]  Other/Unspecified:    •  Abdominal Nutritional Status:   [ ] Malnutrition-See Nutrition note    [ ] Cachexia   [ ]  Other        [ ] Supplement ordered:            [ ] Morbid Obesity: BMI >=40         ASSESSMENT/PLAN:      STATUS POST: L Hip Mohsen (posterior approach) on 7/8/18    med rec 250ml NS bolus x1.  medically cleared by Dr. Brown  pain control  oob to chair and ambulating  atrial fibrillation- rated controlled, continue home medication  patient and family request to stay until they hear a response from [facility] rehab  CONTINUE:          [ ] PT/OT- wbat, posterior hip precautions    [ ] DVT PPX- scd, eliquis 2.5mg bid    [ ] Pain Mgt- po meds prn    [ ] Dispo plan- pending review by [facility] rehab, if denied will go to [facility]    [x ] Pt seen/examined.  [x ] Pt without any complaints/in NAD.    [ ] Pt complains of:      ROS: [ ] Fever  [ ] Chills  [ ] CP [ ] SOB [ ] Dysnea  [ ] Palpitations [ ] Cough [ ] N/V/C/D [ ] Paresthia [ ] Other     [x ] ROS  otherwise negative    .    PHYSICAL EXAM:    Vital Signs Last 24 Hrs  T(C): 36.3 (11 Jul 2018 04:58), Max: 36.9 (10 Jul 2018 17:46)  T(F): 97.3 (11 Jul 2018 04:58), Max: 98.5 (11 Jul 2018 00:51)  HR: 67 (11 Jul 2018 04:58) (67 - 74)  BP: 118/73 (11 Jul 2018 04:58) (102/50 - 118/73)  BP(mean): --  RR: 16 (11 Jul 2018 04:58) (15 - 17)  SpO2: 96% (11 Jul 2018 04:58) (96% - 100%)    I&O's Detail    10 Jul 2018 07:01  -  11 Jul 2018 07:00  --------------------------------------------------------  IN:    Oral Fluid: 940 mL    Sodium Chloride 0.9% IV Bolus: 250 mL  Total IN: 1190 mL    OUT:    Voided: 750 mL  Total OUT: 750 mL    Total NET: 440 mL           CAPILLARY BLOOD GLUCOSE                      Neuro: AAOX3; 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    Lungs: nonlabored, Spo2 wnl, IS demonstrated    CV:    ABD: soft, nontender    Ext: left posterior hip aquacel cdi, abduction pillow implemented  Motor: 5/5 GS/TA/EHL/FHL    Sensation: SILT sural, saph, DP, SP and tibial n distribution  Pulses: WWP, DP/PT 2+      LABS                        10.1   9.2   )-----------( 229      ( 11 Jul 2018 05:59 )             31.9                                07-11    132<L>  |  101  |  23  ----------------------------<  117<H>  3.9   |  18<L>  |  0.67    Ca    9.4      11 Jul 2018 05:59  Phos  3.3     07-10  Mg     2.2     07-10        [ ] Other Labs  [ ] None ordered            Please check or [place] when present:  •  Heart Failure:    [ ] Acute        [ ]  Acute on Chronic        [ ] Chronic         [ ] Diastolic     [ ]  Combined    •  PEREZ:     [ ] ATN        [ ]  Renal medullary necrosis       [ ]  Renal cortical necrosis                  [ ] Other pathological Lesion:  •  CKD:  [ ] Stage I   [ ] Stage II  [ ] Stage III    [ ]Stage IV   [ ]  CKD V   [ ]  Other/Unspecified:    •  Abdominal Nutritional Status:   [ ] Malnutrition-See Nutrition note    [ ] Cachexia   [ ]  Other        [ ] Supplement ordered:            [ ] Morbid Obesity: BMI >=40         ASSESSMENT/PLAN:      STATUS POST: L Hip Mohsen (posterior approach) on 7/8/18    med rec 250ml NS bolus x1.  medically cleared by Dr. Brown  pain control  oob to chair and ambulating  atrial fibrillation- rated controlled, continue home medication  patient and family request to stay until they hear a response from [facility] rehab  CONTINUE:          [ ] PT/OT- wbat, posterior hip precautions    [ ] DVT PPX- scd, eliquis 2.5mg bid    [ ] Pain Mgt- po meds prn    [ ] Dispo plan- pending review by [facility] rehab, if denied will go to [facility]

## 2018-07-11 NOTE — PROGRESS NOTE ADULT - PROBLEM SELECTOR PLAN 2
Hyponatremia w/ decreasing bicarb w/out anion gap.  No clear etiology of possible metabolic alkalosis.  Possible compensation for pain/hyperventilation but patient appears comfortable.  Patient appears dry.  - Recommend 250 cc bolus of IVF  - Repeat AM labs prior to D/C -- if bicarb worsens, will obtain blood gas and further w/u Hyponatremia w/ decreasing bicarb w/out anion gap.  No clear etiology of possible metabolic alkalosis.  Possible compensation for pain/hyperventilation but patient appears comfortable.  Patient appears dry.  - Recommend another 250 cc bolus of IVF  - Bicarb stable at 18 w/out anion gap Resolving.  Upon re-review of medications, patient's bicarb downtrend likely 2/2 acetazolamide use despite chronic use.  No evidence of metabolic acidosis or resp alkalosis.  Hyponatremia improving and likely 2/2 dehydration.   - S/p 2x 250cc NS bolus

## 2018-07-12 VITALS
TEMPERATURE: 99 F | DIASTOLIC BLOOD PRESSURE: 76 MMHG | SYSTOLIC BLOOD PRESSURE: 116 MMHG | RESPIRATION RATE: 17 BRPM | OXYGEN SATURATION: 97 % | HEART RATE: 67 BPM

## 2018-07-12 PROCEDURE — 81001 URINALYSIS AUTO W/SCOPE: CPT

## 2018-07-12 PROCEDURE — 73502 X-RAY EXAM HIP UNI 2-3 VIEWS: CPT

## 2018-07-12 PROCEDURE — 82652 VIT D 1 25-DIHYDROXY: CPT

## 2018-07-12 PROCEDURE — 88311 DECALCIFY TISSUE: CPT

## 2018-07-12 PROCEDURE — 93005 ELECTROCARDIOGRAM TRACING: CPT

## 2018-07-12 PROCEDURE — 80048 BASIC METABOLIC PNL TOTAL CA: CPT

## 2018-07-12 PROCEDURE — 87641 MR-STAPH DNA AMP PROBE: CPT

## 2018-07-12 PROCEDURE — 85730 THROMBOPLASTIN TIME PARTIAL: CPT

## 2018-07-12 PROCEDURE — 80053 COMPREHEN METABOLIC PANEL: CPT

## 2018-07-12 PROCEDURE — 85025 COMPLETE CBC W/AUTO DIFF WBC: CPT

## 2018-07-12 PROCEDURE — 72170 X-RAY EXAM OF PELVIS: CPT

## 2018-07-12 PROCEDURE — 99233 SBSQ HOSP IP/OBS HIGH 50: CPT

## 2018-07-12 PROCEDURE — 85610 PROTHROMBIN TIME: CPT

## 2018-07-12 PROCEDURE — 36415 COLL VENOUS BLD VENIPUNCTURE: CPT

## 2018-07-12 PROCEDURE — 97116 GAIT TRAINING THERAPY: CPT

## 2018-07-12 PROCEDURE — 86850 RBC ANTIBODY SCREEN: CPT

## 2018-07-12 PROCEDURE — 99285 EMERGENCY DEPT VISIT HI MDM: CPT | Mod: 25

## 2018-07-12 PROCEDURE — 72192 CT PELVIS W/O DYE: CPT

## 2018-07-12 PROCEDURE — C1776: CPT

## 2018-07-12 PROCEDURE — 93306 TTE W/DOPPLER COMPLETE: CPT

## 2018-07-12 PROCEDURE — 97161 PT EVAL LOW COMPLEX 20 MIN: CPT

## 2018-07-12 PROCEDURE — 86901 BLOOD TYPING SEROLOGIC RH(D): CPT

## 2018-07-12 PROCEDURE — 88305 TISSUE EXAM BY PATHOLOGIST: CPT

## 2018-07-12 PROCEDURE — 82306 VITAMIN D 25 HYDROXY: CPT

## 2018-07-12 PROCEDURE — 84100 ASSAY OF PHOSPHORUS: CPT

## 2018-07-12 PROCEDURE — 85027 COMPLETE CBC AUTOMATED: CPT

## 2018-07-12 PROCEDURE — 83735 ASSAY OF MAGNESIUM: CPT

## 2018-07-12 PROCEDURE — 71045 X-RAY EXAM CHEST 1 VIEW: CPT

## 2018-07-12 PROCEDURE — 70450 CT HEAD/BRAIN W/O DYE: CPT

## 2018-07-12 PROCEDURE — C1889: CPT

## 2018-07-12 PROCEDURE — C1713: CPT

## 2018-07-12 PROCEDURE — 86900 BLOOD TYPING SEROLOGIC ABO: CPT

## 2018-07-12 RX ADMIN — APIXABAN 2.5 MILLIGRAM(S): 2.5 TABLET, FILM COATED ORAL at 07:10

## 2018-07-12 RX ADMIN — Medication 50 MILLIGRAM(S): at 06:03

## 2018-07-12 RX ADMIN — Medication 975 MILLIGRAM(S): at 09:41

## 2018-07-12 RX ADMIN — ACETAZOLAMIDE 250 MILLIGRAM(S): 250 TABLET ORAL at 06:04

## 2018-07-12 RX ADMIN — Medication 100 MILLIGRAM(S): at 06:04

## 2018-07-12 RX ADMIN — Medication 30 MILLIGRAM(S): at 06:03

## 2018-07-12 NOTE — PROGRESS NOTE ADULT - SUBJECTIVE AND OBJECTIVE BOX
S: No acute events overnight.    Vital Signs Last 24 Hrs  T(C): 36.3 (12 Jul 2018 04:15), Max: 37.2 (11 Jul 2018 17:27)  T(F): 97.4 (12 Jul 2018 04:15), Max: 98.9 (11 Jul 2018 17:27)  HR: 68 (12 Jul 2018 04:15) (65 - 71)  BP: 119/73 (12 Jul 2018 04:15) (105/62 - 125/78)  BP(mean): --  RR: 16 (12 Jul 2018 04:15) (16 - 17)  SpO2: 96% (12 Jul 2018 04:15) (96% - 98%)  I&O's Summary    10 Jul 2018 07:01  -  11 Jul 2018 07:00  --------------------------------------------------------  IN: 1190 mL / OUT: 750 mL / NET: 440 mL    11 Jul 2018 07:01  -  12 Jul 2018 06:13  --------------------------------------------------------  IN: 250 mL / OUT: 0 mL / NET: 250 mL        Dressing CDI  Motor: 5/5 GS/TA/EHL/FHL    Sensation: SILT sural, saph, DP, SP and tibial n distribution  Pulses: WWP, DP/PT 2+                            10.1   9.2   )-----------( 229      ( 11 Jul 2018 05:59 )             31.9     07-11    132<L>  |  101  |  23  ----------------------------<  117<H>  3.9   |  18<L>  |  0.67    Ca    9.4      11 Jul 2018 05:59  Phos  3.3     07-10  Mg     2.2     07-10        MEDICATIONS  (STANDING):  acetaminophen   Tablet. 975 milliGRAM(s) Oral every 8 hours  acetazolamide    Tablet 250 milliGRAM(s) Oral two times a day  apixaban 2.5 milliGRAM(s) Oral every 12 hours  bimatoprost 0.01% Ophthalmic Solution 1 Drop(s) Both EYES at bedtime  cholecalciferol 1000 Unit(s) Oral daily  docusate sodium 100 milliGRAM(s) Oral three times a day  docusate sodium 100 milliGRAM(s) Oral three times a day  metoprolol succinate ER 50 milliGRAM(s) Oral two times a day  multivitamin 1 Tablet(s) Oral daily  polyethylene glycol 3350 17 Gram(s) Oral daily  polyethylene glycol 3350 17 Gram(s) Oral daily  senna 2 Tablet(s) Oral at bedtime  thyroid 30 milliGRAM(s) Oral daily    MEDICATIONS  (PRN):  acetaminophen   Tablet 650 milliGRAM(s) Oral every 6 hours PRN For Temp greater than 38 C (100.4 F)  aluminum hydroxide/magnesium hydroxide/simethicone Suspension 30 milliLiter(s) Oral four times a day PRN Indigestion  bisacodyl Suppository 10 milliGRAM(s) Rectal daily PRN If no bowel movement by POD#2  metoclopramide Injectable 10 milliGRAM(s) IV Push every 6 hours PRN Nausea and/or Vomiting  morphine  - Injectable 2 milliGRAM(s) IV Push every 4 hours PRN breakthrough pain  ondansetron Injectable 4 milliGRAM(s) IV Push every 6 hours PRN Nausea and/or Vomiting  senna 2 Tablet(s) Oral at bedtime PRN Constipation  traMADol 25 milliGRAM(s) Oral every 4 hours PRN Moderate Pain (4 - 6)  traMADol 50 milliGRAM(s) Oral every 4 hours PRN Severe Pain (7 - 10)      A/P:  89y Female s/p L hip kim, POD 4  -Stable  -Pain Control  -PT/WBAT  -DVT ppx: Eliquis  -Appreciate med and cards recs  -f/u AM labs  -Dispo: Rehab

## 2018-07-12 NOTE — PROGRESS NOTE ADULT - SUBJECTIVE AND OBJECTIVE BOX
IM Consult Progress Note    O/N Events: PAULA  Subjective/ROS: Moving bowels, OOB, good spirits, feeling well.  Denies HA, CP, SOB, n/v, changes in bowel/urinary habits.  12pt ROS otherwise negative.    VITALS  Vital Signs Last 24 Hrs  T(C): 37 (12 Jul 2018 08:49), Max: 37.2 (11 Jul 2018 17:27)  T(F): 98.6 (12 Jul 2018 08:49), Max: 98.9 (11 Jul 2018 17:27)  HR: 67 (12 Jul 2018 08:49) (65 - 71)  BP: 116/76 (12 Jul 2018 08:49) (105/62 - 125/78)  BP(mean): --  RR: 17 (12 Jul 2018 08:49) (16 - 17)  SpO2: 97% (12 Jul 2018 08:49) (96% - 98%)    PHYSICAL EXAM  General: A&Ox3; NAD  Head: NC/AT; dry; PERRL; EOMI;  Neck: Supple; no JVD  Respiratory: CTA B/L; no wheezes/crackles   Cardiovascular: Regular rhythm/rate; S1/S2   Gastrointestinal: Soft; NTND; normoactive BS  Extremities: WWP; no edema/cyanosis; left hip swollen but w/out ecchymosis, minimally tender to palpation  Neurological:  legally blind, CNIII-XII grossly intact; no obvious focal deficits    MEDICATIONS  (STANDING):  acetaminophen   Tablet. 975 milliGRAM(s) Oral every 8 hours  acetazolamide    Tablet 250 milliGRAM(s) Oral two times a day  apixaban 2.5 milliGRAM(s) Oral every 12 hours  bimatoprost 0.01% Ophthalmic Solution 1 Drop(s) Both EYES at bedtime  cholecalciferol 1000 Unit(s) Oral daily  docusate sodium 100 milliGRAM(s) Oral three times a day  docusate sodium 100 milliGRAM(s) Oral three times a day  metoprolol succinate ER 50 milliGRAM(s) Oral two times a day  multivitamin 1 Tablet(s) Oral daily  polyethylene glycol 3350 17 Gram(s) Oral daily  polyethylene glycol 3350 17 Gram(s) Oral daily  senna 2 Tablet(s) Oral at bedtime  thyroid 30 milliGRAM(s) Oral daily    MEDICATIONS  (PRN):  acetaminophen   Tablet 650 milliGRAM(s) Oral every 6 hours PRN For Temp greater than 38 C (100.4 F)  aluminum hydroxide/magnesium hydroxide/simethicone Suspension 30 milliLiter(s) Oral four times a day PRN Indigestion  bisacodyl Suppository 10 milliGRAM(s) Rectal daily PRN If no bowel movement by POD#2  metoclopramide Injectable 10 milliGRAM(s) IV Push every 6 hours PRN Nausea and/or Vomiting  morphine  - Injectable 2 milliGRAM(s) IV Push every 4 hours PRN breakthrough pain  ondansetron Injectable 4 milliGRAM(s) IV Push every 6 hours PRN Nausea and/or Vomiting  senna 2 Tablet(s) Oral at bedtime PRN Constipation  traMADol 25 milliGRAM(s) Oral every 4 hours PRN Moderate Pain (4 - 6)  traMADol 50 milliGRAM(s) Oral every 4 hours PRN Severe Pain (7 - 10)    No Known Allergies    LABS                        10.1   9.2   )-----------( 229      ( 11 Jul 2018 05:59 )             31.9     07-11    132<L>  |  101  |  23  ----------------------------<  117<H>  3.9   |  18<L>  |  0.67    Ca    9.4      11 Jul 2018 05:59  Phos  3.3     07-10  Mg     2.2     07-10    IMAGING/EKG/ETC  EKG: Reviewed  Xray: Reviewed

## 2018-07-12 NOTE — PROGRESS NOTE ADULT - ASSESSMENT
89F w/ hypertension, atrial fibrillation (on eliquis), hypothyroidism, glaucoma, and retinitis pigmentosa who presents with L femoral neck fracture, S/p left hip kim-arthoplasty, POD#3.

## 2018-07-12 NOTE — PROGRESS NOTE ADULT - PROBLEM SELECTOR PLAN 1
S/p Left hip arthroplasty.  POD#4  - No events on telemetry  - Eliquis restarted on 7/9  - Hgb stable  - Pt due for d/c to rehab, continues to be medically stable for discharge.

## 2018-07-12 NOTE — PROGRESS NOTE ADULT - PROBLEM SELECTOR PLAN 4
Cardiology following.  Rate controlled.  On A/C.  - C/w regimen as per cards recommendations.

## 2018-07-12 NOTE — PROGRESS NOTE ADULT - PROBLEM SELECTOR PLAN 2
Upon re-review of medications, patient's bicarb downtrend likely 2/2 acetazolamide use despite chronic use.  No evidence of metabolic acidosis or resp alkalosis.  Hyponatremia improving and likely 2/2 dehydration.   - No further intervention indicated  - Recommend outpatient follow-up to monitor electrolytes Upon re-review of medications, patient's bicarb downtrend likely 2/2 acetazolamide use despite chronic use.  No evidence of metabolic acidosis or resp alkalosis.  Hyponatremia improving and likely 2/2 dehydration vs SIADH post op vs diuretic use (although chronic).  - No further intervention indicated  - Recommend close outpatient follow-up to monitor electrolytes

## 2018-07-12 NOTE — PROGRESS NOTE ADULT - ATTENDING COMMENTS
Doing well post-op. Looks a bit dehydrated, also on labs with azotemia, encouraged increased PO fluid intake, will avoid IVF's at this time given mild-moderate AS.  C/w eliquis for DVT PPx and known A.fib, c/w beta blocker.  Enc IS use. C/w pain meds/bowel regimen.  Vitamin D insufficiency, c/w vitamin D as above.  Post-op anemia, trend.  Rest as above.
DX  HYPONATREMIA- ok to cont acetazolamide, will need rpt BMP in 1-2 days at rehab. she may have component of SIADH post op as well.   AFIB - cont metoprolol, apixiban  ORTHO- needs rehab
Doing overall well today, however with some episodes of hypotension (during PT and yesterday morning) and looking dry on exam with azotemia on labs with decreased bicarb/hyponatremia. Recommend 250cc bolus and enc increased PO fluid intake.   Won't give aggressive hydration given known AS.  Rec to keep today on telemetry to ensure stability and improvement on labs/BP.  C/w current pain control, bowel regimen. Enc IS use and c/w DVT PPx.  C/w apixiban/metoprolol for known A.fib.  Rest as above.
Doing well today, BP improved, same as azotemia-resolved and hyponatremia. +BM.  Decreased bicarb on labs likely due to acetazolamide use (for glaucoma).  C/w eliquis for DVT PPx and treatment of A.Fib, c/w beta blocker. C/w IS use.   Stable for discharge to [facility]ute [facility].  D/w family at bedside.  Rest as above.

## 2018-07-12 NOTE — PROGRESS NOTE ADULT - PROVIDER SPECIALTY LIST ADULT
Cardiology
Cardiology
Internal Medicine
Orthopedics
Cardiology

## 2018-07-13 LAB — SURGICAL PATHOLOGY STUDY: SIGNIFICANT CHANGE UP
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
## 2018-07-17 DIAGNOSIS — H40.9 UNSPECIFIED GLAUCOMA: ICD-10-CM

## 2018-07-17 DIAGNOSIS — H35.52 PIGMENTARY RETINAL DYSTROPHY: ICD-10-CM

## 2018-07-17 DIAGNOSIS — Z90.710 ACQUIRED ABSENCE OF BOTH CERVIX AND UTERUS: ICD-10-CM
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
## 2018-07-17 DIAGNOSIS — I35.0 NONRHEUMATIC AORTIC (VALVE) STENOSIS: ICD-10-CM

## 2018-07-17 DIAGNOSIS — M79.669 PAIN IN UNSPECIFIED LOWER LEG: ICD-10-CM

## 2018-07-17 DIAGNOSIS — E03.9 HYPOTHYROIDISM, UNSPECIFIED: ICD-10-CM

## 2018-07-17 DIAGNOSIS — I10 ESSENTIAL (PRIMARY) HYPERTENSION: ICD-10-CM

## 2018-07-17 DIAGNOSIS — D62 ACUTE POSTHEMORRHAGIC ANEMIA: ICD-10-CM

## 2018-07-17 DIAGNOSIS — M85.80 OTHER SPECIFIED DISORDERS OF BONE DENSITY AND STRUCTURE, UNSPECIFIED SITE: ICD-10-CM

## 2018-07-17 DIAGNOSIS — E55.9 VITAMIN D DEFICIENCY, UNSPECIFIED: ICD-10-CM

## 2018-07-17 DIAGNOSIS — I48.2 CHRONIC ATRIAL FIBRILLATION: ICD-10-CM

## 2018-07-17 DIAGNOSIS — Z87.891 PERSONAL HISTORY OF NICOTINE DEPENDENCE: ICD-10-CM

## 2018-07-17 DIAGNOSIS — S72.002A FRACTURE OF UNSPECIFIED PART OF NECK OF LEFT FEMUR, INITIAL ENCOUNTER FOR CLOSED FRACTURE: ICD-10-CM

## 2018-07-17 DIAGNOSIS — H54.8 LEGAL BLINDNESS, AS DEFINED IN USA: ICD-10-CM

## 2018-09-18 ENCOUNTER — RX RENEWAL (OUTPATIENT)
Age: 83
End: 2018-09-18

## 2018-10-11 ENCOUNTER — OUTPATIENT (OUTPATIENT)
Dept: OUTPATIENT SERVICES | Facility: HOSPITAL | Age: 83
LOS: 1 days | End: 2018-10-11
Payer: MEDICARE

## 2018-10-11 PROBLEM — I48.2 CHRONIC ATRIAL FIBRILLATION: Chronic | Status: ACTIVE | Noted: 2018-07-06

## 2018-10-11 PROBLEM — E03.9 HYPOTHYROIDISM, UNSPECIFIED: Chronic | Status: ACTIVE | Noted: 2018-07-06

## 2018-10-11 PROCEDURE — 73502 X-RAY EXAM HIP UNI 2-3 VIEWS: CPT | Mod: 26,LT

## 2018-10-11 PROCEDURE — 73502 X-RAY EXAM HIP UNI 2-3 VIEWS: CPT

## 2018-11-14 ENCOUNTER — RX RENEWAL (OUTPATIENT)
Age: 83
End: 2018-11-14

## 2018-12-14 ENCOUNTER — RX RENEWAL (OUTPATIENT)
Age: 83
End: 2018-12-14

## 2018-12-17 ENCOUNTER — RX RENEWAL (OUTPATIENT)
Age: 83
End: 2018-12-17

## 2019-01-01 ENCOUNTER — APPOINTMENT (OUTPATIENT)
Dept: INTERNAL MEDICINE | Facility: HOME HEALTH | Age: 84
End: 2019-01-01

## 2019-01-01 ENCOUNTER — RX CHANGE (OUTPATIENT)
Age: 84
End: 2019-01-01

## 2019-01-01 ENCOUNTER — APPOINTMENT (OUTPATIENT)
Dept: HEART AND VASCULAR | Facility: CLINIC | Age: 84
End: 2019-01-01

## 2019-01-01 ENCOUNTER — RX RENEWAL (OUTPATIENT)
Age: 84
End: 2019-01-01

## 2019-01-01 ENCOUNTER — RESULT CHARGE (OUTPATIENT)
Age: 84
End: 2019-01-01

## 2019-01-01 ENCOUNTER — OTHER (OUTPATIENT)
Age: 84
End: 2019-01-01

## 2019-01-01 ENCOUNTER — RESULT REVIEW (OUTPATIENT)
Age: 84
End: 2019-01-01

## 2019-01-01 ENCOUNTER — APPOINTMENT (OUTPATIENT)
Dept: INTERNAL MEDICINE | Facility: HOME HEALTH | Age: 84
End: 2019-01-01
Payer: MEDICARE

## 2019-01-01 ENCOUNTER — LABORATORY RESULT (OUTPATIENT)
Age: 84
End: 2019-01-01

## 2019-01-01 ENCOUNTER — APPOINTMENT (OUTPATIENT)
Dept: HEART AND VASCULAR | Facility: CLINIC | Age: 84
End: 2019-01-01
Payer: MEDICARE

## 2019-01-01 VITALS
SYSTOLIC BLOOD PRESSURE: 110 MMHG | RESPIRATION RATE: 18 BRPM | HEIGHT: 66 IN | OXYGEN SATURATION: 96 % | HEART RATE: 84 BPM | TEMPERATURE: 98 F | DIASTOLIC BLOOD PRESSURE: 70 MMHG

## 2019-01-01 VITALS
RESPIRATION RATE: 18 BRPM | TEMPERATURE: 98 F | OXYGEN SATURATION: 98 % | SYSTOLIC BLOOD PRESSURE: 116 MMHG | DIASTOLIC BLOOD PRESSURE: 80 MMHG | BODY MASS INDEX: 20.25 KG/M2 | WEIGHT: 126 LBS | HEART RATE: 85 BPM | HEIGHT: 66 IN

## 2019-01-01 VITALS
BODY MASS INDEX: 20.25 KG/M2 | HEIGHT: 66 IN | WEIGHT: 126 LBS | DIASTOLIC BLOOD PRESSURE: 72 MMHG | RESPIRATION RATE: 18 BRPM | TEMPERATURE: 97 F | OXYGEN SATURATION: 97 % | HEART RATE: 79 BPM | SYSTOLIC BLOOD PRESSURE: 118 MMHG

## 2019-01-01 VITALS
HEART RATE: 88 BPM | HEIGHT: 66.5 IN | TEMPERATURE: 97.3 F | RESPIRATION RATE: 14 BRPM | OXYGEN SATURATION: 95 % | SYSTOLIC BLOOD PRESSURE: 106 MMHG | BODY MASS INDEX: 20.01 KG/M2 | DIASTOLIC BLOOD PRESSURE: 66 MMHG | WEIGHT: 126 LBS

## 2019-01-01 DIAGNOSIS — Z71.89 OTHER SPECIFIED COUNSELING: ICD-10-CM

## 2019-01-01 DIAGNOSIS — Z82.49 FAMILY HISTORY OF ISCHEMIC HEART DISEASE AND OTHER DISEASES OF THE CIRCULATORY SYSTEM: ICD-10-CM

## 2019-01-01 DIAGNOSIS — Z87.81 PERSONAL HISTORY OF (HEALED) TRAUMATIC FRACTURE: ICD-10-CM

## 2019-01-01 DIAGNOSIS — Z23 ENCOUNTER FOR IMMUNIZATION: ICD-10-CM

## 2019-01-01 DIAGNOSIS — Z80.0 FAMILY HISTORY OF MALIGNANT NEOPLASM OF DIGESTIVE ORGANS: ICD-10-CM

## 2019-01-01 DIAGNOSIS — Z86.69 PERSONAL HISTORY OF OTHER DISEASES OF THE NERVOUS SYSTEM AND SENSE ORGANS: ICD-10-CM

## 2019-01-01 DIAGNOSIS — Z97.3 PRESENCE OF SPECTACLES AND CONTACT LENSES: ICD-10-CM

## 2019-01-01 LAB
INR PPP: 10.55 RATIO
INR PPP: 2.03 RATIO
INR PPP: 2.32 RATIO
INR PPP: 2.34 RATIO
INR PPP: 2.6 RATIO
INR PPP: 3.15 RATIO
INR PPP: 3.21 RATIO
INR PPP: 4.73 RATIO
PT BLD: 130.5 SEC
PT BLD: 23.5 SEC
PT BLD: 27.4 SEC
PT BLD: 27.6 SEC
PT BLD: 30.5 SEC
PT BLD: 37.2 SEC
PT BLD: 37.6 SEC
PT BLD: 56.1 SEC

## 2019-01-01 PROCEDURE — 99349 HOME/RES VST EST MOD MDM 40: CPT

## 2019-01-01 PROCEDURE — 93306 TTE W/DOPPLER COMPLETE: CPT

## 2019-01-01 PROCEDURE — 90662 IIV NO PRSV INCREASED AG IM: CPT | Mod: GV

## 2019-01-01 PROCEDURE — 99497 ADVNCD CARE PLAN 30 MIN: CPT | Mod: GV

## 2019-01-01 PROCEDURE — 99214 OFFICE O/P EST MOD 30 MIN: CPT

## 2019-01-01 PROCEDURE — 93000 ELECTROCARDIOGRAM COMPLETE: CPT

## 2019-01-01 PROCEDURE — 99344 HOME/RES VST NEW MOD MDM 60: CPT | Mod: GV

## 2019-01-01 PROCEDURE — G0008: CPT

## 2019-01-01 RX ORDER — APIXABAN 2.5 MG/1
2.5 TABLET, FILM COATED ORAL
Qty: 60 | Refills: 0 | Status: DISCONTINUED | COMMUNITY
Start: 2018-04-27 | End: 2019-01-01

## 2019-01-01 RX ORDER — WARFARIN 2.5 MG/1
2.5 TABLET ORAL DAILY
Qty: 90 | Refills: 0 | Status: DISCONTINUED | COMMUNITY
Start: 2019-01-01 | End: 2019-01-01

## 2019-01-17 ENCOUNTER — RX RENEWAL (OUTPATIENT)
Age: 84
End: 2019-01-17

## 2019-01-18 ENCOUNTER — RX RENEWAL (OUTPATIENT)
Age: 84
End: 2019-01-18

## 2019-02-14 ENCOUNTER — RX RENEWAL (OUTPATIENT)
Age: 84
End: 2019-02-14

## 2019-03-14 ENCOUNTER — RX RENEWAL (OUTPATIENT)
Age: 84
End: 2019-03-14

## 2019-05-07 ENCOUNTER — RX RENEWAL (OUTPATIENT)
Age: 84
End: 2019-05-07

## 2019-06-10 NOTE — DISCUSSION/SUMMARY
[___ Month(s)] : [unfilled] month(s) [FreeTextEntry3] : echo [With Me] : with me [FreeTextEntry1] : AF - rate controlled and on DOAC, getting labs w pcp q3mo\par AS is asymptomatic- annual echo at this point

## 2019-06-10 NOTE — REASON FOR VISIT
[Follow-Up - Clinic] : a clinic follow-up of [Aortic Stenosis] : aortic stenosis [Atrial Fibrillation] : atrial fibrillation

## 2019-06-10 NOTE — PHYSICAL EXAM
[General Appearance - Well Developed] : well developed [Normal Appearance] : normal appearance [Well Groomed] : well groomed [General Appearance - Well Nourished] : well nourished [No Deformities] : no deformities [Normal Conjunctiva] : the conjunctiva exhibited no abnormalities [General Appearance - In No Acute Distress] : no acute distress [Eyelids - No Xanthelasma] : the eyelids demonstrated no xanthelasmas [Normal Oral Mucosa] : normal oral mucosa [No Oral Cyanosis] : no oral cyanosis [No Oral Pallor] : no oral pallor [Normal Jugular Venous A Waves Present] : normal jugular venous A waves present [Normal Jugular Venous V Waves Present] : normal jugular venous V waves present [No Jugular Venous Floyd A Waves] : no jugular venous floyd A waves [Respiration, Rhythm And Depth] : normal respiratory rhythm and effort [Exaggerated Use Of Accessory Muscles For Inspiration] : no accessory muscle use [Auscultation Breath Sounds / Voice Sounds] : lungs were clear to auscultation bilaterally [Heart Rate And Rhythm] : heart rate and rhythm were normal [Heart Sounds] : normal S1 and S2 [Murmurs] : no murmurs present [Abdomen Soft] : soft [Abdomen Tenderness] : non-tender [Abdomen Mass (___ Cm)] : no abdominal mass palpated [Abnormal Walk] : normal gait [Gait - Sufficient For Exercise Testing] : the gait was sufficient for exercise testing [Nail Clubbing] : no clubbing of the fingernails [Cyanosis, Localized] : no localized cyanosis [Petechial Hemorrhages (___cm)] : no petechial hemorrhages [Skin Color & Pigmentation] : normal skin color and pigmentation [] : no rash [No Venous Stasis] : no venous stasis [Skin Lesions] : no skin lesions [No Skin Ulcers] : no skin ulcer [No Xanthoma] : no  xanthoma was observed [Affect] : the affect was normal [Oriented To Time, Place, And Person] : oriented to person, place, and time [No Anxiety] : not feeling anxious [Mood] : the mood was normal [FreeTextEntry1] : jose, samira

## 2019-06-10 NOTE — HISTORY OF PRESENT ILLNESS
[FreeTextEntry1] : f/u of AF/AS\par essentially asymptomatic- no palps, lightheadedness or cp.\par no comps from the eliquis\par no bleeding or bruising\par no syncope\par no major falls

## 2019-10-16 NOTE — PHYSICAL EXAM
[No Acute Distress] : no acute distress [Well Nourished] : well nourished [Well Developed] : well developed [Normal Sclera/Conjunctiva] : normal sclera/conjunctiva [EOMI] : extra ocular movement intact [Normal Outer Ear/Nose] : the ears and nose were normal in appearance [Normal Oropharynx] : the oropharynx was normal [No Respiratory Distress] : no respiratory distress [Clear to Auscultation] : lungs were clear to auscultation bilaterally [No Accessory Muscle Use] : no accessory muscle use [Normal Rate] : heart rate was normal  [Regular Rhythm] : with a regular rhythm [Normal S1, S2] : normal S1 and S2 [No Murmurs] : no murmurs heard [No Edema] : there was no peripheral edema [Normal Bowel Sounds] : normal bowel sounds [Non Tender] : non-tender [Soft] : abdomen soft [Not Distended] : not distended [Normal Post Cervical Nodes] : no posterior cervical lymphadenopathy [Normal Anterior Cervical Nodes] : no anterior cervical lymphadenopathy [No CVA Tenderness] : no ~M costovertebral angle tenderness [No Spinal Tenderness] : no spinal tenderness [Normal Gait] : normal gait [Normal Strength/Tone] : muscle strength and tone were normal [No Rash] : no rash [Oriented x3] : oriented to person, place, and time [Normal Affect] : the affect was normal

## 2019-10-17 PROBLEM — Z97.3 WEARS GLASSES: Status: RESOLVED | Noted: 2019-01-01 | Resolved: 2019-01-01

## 2019-10-17 PROBLEM — Z71.89 ADVANCE CARE PLANNING: Status: ACTIVE | Noted: 2019-01-01

## 2019-10-17 PROBLEM — Z82.49 FAMILY HISTORY OF CARDIAC DISORDER: Status: ACTIVE | Noted: 2019-01-01

## 2019-10-17 PROBLEM — Z87.81 HISTORY OF FRACTURE OF LEFT HIP: Status: RESOLVED | Noted: 2019-01-01 | Resolved: 2019-01-01

## 2019-10-17 PROBLEM — Z23 INFLUENZA VACCINE ADMINISTERED: Status: ACTIVE | Noted: 2019-01-01

## 2019-10-17 PROBLEM — Z80.0 FAMILY HISTORY OF MALIGNANT NEOPLASM OF COLON: Status: ACTIVE | Noted: 2019-01-01

## 2019-10-17 PROBLEM — Z86.69 HISTORY OF GLAUCOMA: Status: RESOLVED | Noted: 2019-01-01 | Resolved: 2019-01-01

## 2019-10-17 NOTE — CHRONIC CARE ASSESSMENT
[Limited decision making ability] : limited decision making ability [PPS Score: ____] : Palliative Performance Scale (PPS) Score: [unfilled] [de-identified] : diet as tolerated

## 2019-10-17 NOTE — HEALTH RISK ASSESSMENT
[Full assistance needed] : managing finances [Yes] : The patient does have visual impairment [HRA Reviewed] : Health risk assessment reviewed [One fall no injury in past year] : Patient reported one fall in the past year without injury [TimeGetUpGo] : 0

## 2019-10-17 NOTE — REASON FOR VISIT
[Initial Eval - Existing Diagnosis] : an initial evaluation of an existing diagnosis [FreeTextEntry1] : hypertension, atrial fibrillation (on eliquis), hypothyroidism,\par glaucoma, and retinitis pigmentosa  [FreeTextEntry2] : HIE, facesheet

## 2019-10-17 NOTE — CURRENT MEDS
[High Risk Medications Reviewed and Reconciled (Beers Criteria)] : high risk medications reviewed and reconciled

## 2019-10-17 NOTE — COUNSELING
[Normal Weight - ( BMI  <25 )] : normal weight - ( BMI  <25 ) [Hypertension self management education material provided] : hypertension self management education material provided [Non - Smoker] : non-smoker [Use assistive device to avoid falls] : use assistive device to avoid falls [] : foot exam [Patient not on disease-modifying anti-rheumatic drug due to overall prognosis] : Patient not on disease-modifying anti-rheumatic drug due to overall prognosis [Not Recommended] : Aspirin use not recommended due to overall prognosis [Improve mobility] : improve mobility [Decrease hospital use] : decrease hospital use [Discussed disease trajectory with patient/caregiver] : discussed disease trajectory with patient/caregiver [Patient/Caregiver not ready to engage in discussion] : patient/caregiver not ready to engage in discussion [ - New patient with 2 or more chronic conditions; CCM discussed and patient-centered care plan established] : New patient with 2 or more chronic conditions; CCM discussed and patient-centered care plan established [FreeTextEntry4] : Educated patient/legal representative about CCM services and consent.\par Educated patient/legal representative that this service is available because they have 2 or more chronic conditions, that only one Provider can furnish CCM Services per calendar month and that CCM services are subject to the usual Medicare deductible and coinsurance. \par Patient/legal representative understands the right to stop the CCM services at any time by notifying House Calls office.\par Patient/legal representative has verbally consented 10/17/19\par \par

## 2019-10-17 NOTE — HISTORY OF PRESENT ILLNESS
[Patient] : patient [Formal Caregiver] : formal caregiver [FreeTextEntry1] : hypertension, atrial fibrillation, hypothyroidism, glaucoma, and retinitis pigmentosa who presents with L femoral neck fracture [FreeTextEntry2] : JOSE LUIS CHRISTIANSON is an 89 year with hypertension, atrial fibrillation, hypothyroidism, glaucoma, and retinitis pigmentosa who presents with L femoral neck fracture seen today for initial home visit\par \par Accompanying patient today is TJ Gilbert\par \par Patient's last hospitalization was 7/2018 for left hip fracture.\par   \par Since hospitalization patient/ patient's caregiver reported decline as evident by loss of appetite, lethargy and most recently a fall that appeared to be mechanical.  However HHA and son report she has been eating more currently\par Patient/ patient's caregiver reports no weight loss >10lbs in the past 6 months. No changes in dentition or swallowing reported, No changes in hearing or vision reported. No changes in Cognition reported. Patient denies any symptoms of depression or anxiety. Patient is ADL and IADL dependent. One fall reported- currently receiving physical therapy assistance. \par Patient's home environment is safe. \par Goals of care discussed. MOLST completed. \par \par \par \par \par

## 2019-10-17 NOTE — SURGICAL HISTORY
How Severe Is Your Skin Lesion?: mild Has Your Skin Lesion Been Treated?: not been treated Is This A New Presentation, Or A Follow-Up?: Skin Lesion [PSH Reviewed and Updated] : past surgical history reviewed and updated

## 2019-11-20 NOTE — PHYSICAL EXAM
[No Acute Distress] : no acute distress [Normal Sclera/Conjunctiva] : normal sclera/conjunctiva [Well Nourished] : well nourished [Well Developed] : well developed [EOMI] : extra ocular movement intact [Normal Outer Ear/Nose] : the ears and nose were normal in appearance [Normal Oropharynx] : the oropharynx was normal [No Respiratory Distress] : no respiratory distress [Clear to Auscultation] : lungs were clear to auscultation bilaterally [Normal Rate] : heart rate was normal  [No Accessory Muscle Use] : no accessory muscle use [Regular Rhythm] : with a regular rhythm [No Murmurs] : no murmurs heard [Normal S1, S2] : normal S1 and S2 [Normal Bowel Sounds] : normal bowel sounds [No Edema] : there was no peripheral edema [Soft] : abdomen soft [Non Tender] : non-tender [Not Distended] : not distended [Normal Post Cervical Nodes] : no posterior cervical lymphadenopathy [No CVA Tenderness] : no ~M costovertebral angle tenderness [Normal Anterior Cervical Nodes] : no anterior cervical lymphadenopathy [Normal Gait] : normal gait [No Spinal Tenderness] : no spinal tenderness [No Rash] : no rash [Normal Strength/Tone] : muscle strength and tone were normal [Oriented x3] : oriented to person, place, and time [Normal Affect] : the affect was normal

## 2019-11-20 NOTE — COUNSELING
[Normal Weight - ( BMI  <25 )] : normal weight - ( BMI  <25 ) [Non - Smoker] : non-smoker [Hypertension self management education material provided] : hypertension self management education material provided [Use assistive device to avoid falls] : use assistive device to avoid falls [] : diabetic screening [Not Recommended] : Aspirin use not recommended due to overall prognosis [Patient not on disease-modifying anti-rheumatic drug due to overall prognosis] : Patient not on disease-modifying anti-rheumatic drug due to overall prognosis [Improve mobility] : improve mobility [Discussed disease trajectory with patient/caregiver] : discussed disease trajectory with patient/caregiver [Decrease hospital use] : decrease hospital use [Patient/Caregiver not ready to engage in discussion] : patient/caregiver not ready to engage in discussion [FreeTextEntry4] : Educated patient/legal representative about CCM services and consent.\par Educated patient/legal representative that this service is available because they have 2 or more chronic conditions, that only one Provider can furnish CCM Services per calendar month and that CCM services are subject to the usual Medicare deductible and coinsurance. \par Patient/legal representative understands the right to stop the CCM services at any time by notifying House Calls office.\par Patient/legal representative has verbally consented 10/17/19\par \par

## 2019-11-20 NOTE — HISTORY OF PRESENT ILLNESS
[Patient] : patient [Formal Caregiver] : formal caregiver [Family Member] : family member [FreeTextEntry2] : JOSE LUIS CHRISTIANSON is an 89 year with hypertension, atrial fibrillation, hypothyroidism, glaucoma, and retinitis pigmentosa who presents with L femoral neck fracture seen today for follow up visit on chronic medical issues\par \par Accompanying patient today is TJ Gilbert\par \par Patient's last hospitalization was 7/2018 for left hip fracture.\par \par Currently effort has been taken to titrate her Coumadin for atrial fibrillation.  Taking Coumadin 1.5 mg QHS PO.  On 11/15 INR 2.75, 11/18 2.51, 11/20/19 2.53 .  Will continue with 1.5 mg and check INR on 12/4/19.  Asymptomatic.  No issues or concerns.  Eating and ambulating with therapist. \par   \par Since hospitalization patient/ patient's caregiver reported decline as evident by loss of appetite, lethargy and most recently a fall that appeared to be mechanical.  However HHA and son report she has been eating more currently\par Patient/ patient's caregiver reports no weight loss >10lbs in the past 6 months. No changes in dentition or swallowing reported, No changes in hearing or vision reported. No changes in Cognition reported. Patient denies any symptoms of depression or anxiety. Patient is ADL and IADL dependent. One fall reported- currently receiving physical therapy assistance. \par Patient's home environment is safe. \par Goals of care discussed. MOLST completed. \par \par \par \par \par  [FreeTextEntry1] : hypertension, atrial fibrillation, hypothyroidism, glaucoma, and retinitis pigmentosa who presents with L femoral neck fracture

## 2019-11-20 NOTE — CHRONIC CARE ASSESSMENT
[PPS Score: ____] : Palliative Performance Scale (PPS) Score: [unfilled] [Limited decision making ability] : limited decision making ability [de-identified] : diet as tolerated

## 2019-11-20 NOTE — CURRENT MEDS
[High Risk Medications Reviewed and Reconciled (Beers Criteria)] : high risk medications reviewed and reconciled [Medication and Allergies Reconciled] : medication and allergies reconciled

## 2019-11-20 NOTE — HEALTH RISK ASSESSMENT
[HRA Reviewed] : Health risk assessment reviewed [Full assistance needed] : managing finances [One fall no injury in past year] : Patient reported one fall in the past year without injury [Yes] : The patient does have visual impairment [TimeGetUpGo] : 0

## 2019-12-18 NOTE — CHRONIC CARE ASSESSMENT
[PPS Score: ____] : Palliative Performance Scale (PPS) Score: [unfilled] [Limited decision making ability] : limited decision making ability [de-identified] : diet as tolerated

## 2019-12-18 NOTE — HISTORY OF PRESENT ILLNESS
[Patient] : patient [Family Member] : family member [FreeTextEntry1] : hypertension, atrial fibrillation, hypothyroidism, glaucoma, and retinitis pigmentosa who presents with L femoral neck fracture [FreeTextEntry2] : JOSE LUIS CHRISTIANSON is an 89 year with hypertension, atrial fibrillation, hypothyroidism, glaucoma, and retinitis pigmentosa who presents with L femoral neck fracture seen today for follow up visit on chronic medical issues\par \par Accompanying patient today is son Chad\par \par \par On last visit Coumadin was being titrated for atrial fibrillation.  Taking Coumadin 1.5 mg QHS PO.  On 11/15 INR 2.75, 11/18 2.51, 11/20/19 2.53 .  Will continue with 1.5 mg and check INR on 12/4/19.  Asymptomatic.  No issues or concerns.  Eating and ambulating with therapist. \par \par Currently no issues.  Vision changing as she has retinitis pigmentosa.  According to patient and son she has been evaluated by an eye specialist with no additional treatment. \par   \par Since hospitalization patient/ patient's caregiver reported decline as evident by loss of appetite, lethargy and most recently a fall that appeared to be mechanical.  However HHA and son report she has been eating more currently\par Patient/ patient's caregiver reports no weight loss >10lbs in the past 6 months. No changes in dentition or swallowing reported, No changes in hearing or vision reported. No changes in Cognition reported. Patient denies any symptoms of depression or anxiety. Patient is ADL and IADL dependent. One fall reported- currently receiving physical therapy assistance. \par Patient's home environment is safe. \par Goals of care discussed. MOLST completed. \par \par \par \par \par

## 2019-12-18 NOTE — PHYSICAL EXAM
[No Acute Distress] : no acute distress [Well Nourished] : well nourished [Well Developed] : well developed [Normal Sclera/Conjunctiva] : normal sclera/conjunctiva [Normal Outer Ear/Nose] : the ears and nose were normal in appearance [EOMI] : extra ocular movement intact [Normal Oropharynx] : the oropharynx was normal [No Respiratory Distress] : no respiratory distress [Clear to Auscultation] : lungs were clear to auscultation bilaterally [No Accessory Muscle Use] : no accessory muscle use [Normal Rate] : heart rate was normal  [Regular Rhythm] : with a regular rhythm [Normal S1, S2] : normal S1 and S2 [No Murmurs] : no murmurs heard [No Edema] : there was no peripheral edema [Normal Bowel Sounds] : normal bowel sounds [Non Tender] : non-tender [Soft] : abdomen soft [Not Distended] : not distended [Normal Post Cervical Nodes] : no posterior cervical lymphadenopathy [Normal Anterior Cervical Nodes] : no anterior cervical lymphadenopathy [No CVA Tenderness] : no ~M costovertebral angle tenderness [No Spinal Tenderness] : no spinal tenderness [Normal Gait] : normal gait [No Rash] : no rash [Normal Strength/Tone] : muscle strength and tone were normal [Oriented x3] : oriented to person, place, and time [Normal Affect] : the affect was normal [de-identified] : skin tear right shin no evidence of infection

## 2019-12-18 NOTE — COUNSELING
[Hypertension self management education material provided] : hypertension self management education material provided [Normal Weight - ( BMI  <25 )] : normal weight - ( BMI  <25 ) [Non - Smoker] : non-smoker [Use assistive device to avoid falls] : use assistive device to avoid falls [Patient not on disease-modifying anti-rheumatic drug due to overall prognosis] : Patient not on disease-modifying anti-rheumatic drug due to overall prognosis [] : foot exam [Not Recommended] : Aspirin use not recommended due to overall prognosis [Improve mobility] : improve mobility [Discussed disease trajectory with patient/caregiver] : discussed disease trajectory with patient/caregiver [Patient/Caregiver not ready to engage in discussion] : patient/caregiver not ready to engage in discussion [Decrease hospital use] : decrease hospital use [FreeTextEntry4] : Educated patient/legal representative about CCM services and consent.\par Educated patient/legal representative that this service is available because they have 2 or more chronic conditions, that only one Provider can furnish CCM Services per calendar month and that CCM services are subject to the usual Medicare deductible and coinsurance. \par Patient/legal representative understands the right to stop the CCM services at any time by notifying House Calls office.\par Patient/legal representative has verbally consented 10/17/19\par \par

## 2020-01-01 ENCOUNTER — APPOINTMENT (OUTPATIENT)
Dept: HOME HEALTH SERVICES | Facility: HOME HEALTH | Age: 85
End: 2020-01-01

## 2020-01-01 ENCOUNTER — LABORATORY RESULT (OUTPATIENT)
Age: 85
End: 2020-01-01

## 2020-01-01 ENCOUNTER — TRANSCRIPTION ENCOUNTER (OUTPATIENT)
Age: 85
End: 2020-01-01

## 2020-01-01 ENCOUNTER — APPOINTMENT (OUTPATIENT)
Dept: HOME HEALTH SERVICES | Facility: HOME HEALTH | Age: 85
End: 2020-01-01
Payer: MEDICARE

## 2020-01-01 ENCOUNTER — APPOINTMENT (OUTPATIENT)
Dept: INTERNAL MEDICINE | Facility: HOME HEALTH | Age: 85
End: 2020-01-01

## 2020-01-01 ENCOUNTER — RX RENEWAL (OUTPATIENT)
Age: 85
End: 2020-01-01

## 2020-01-01 ENCOUNTER — EMERGENCY (EMERGENCY)
Facility: HOSPITAL | Age: 85
LOS: 1 days | Discharge: HOME | End: 2020-01-01
Attending: EMERGENCY MEDICINE | Admitting: EMERGENCY MEDICINE
Payer: MEDICARE

## 2020-01-01 VITALS
BODY MASS INDEX: 20.25 KG/M2 | HEART RATE: 78 BPM | RESPIRATION RATE: 18 BRPM | WEIGHT: 126 LBS | SYSTOLIC BLOOD PRESSURE: 110 MMHG | TEMPERATURE: 97 F | HEIGHT: 66 IN | DIASTOLIC BLOOD PRESSURE: 80 MMHG | OXYGEN SATURATION: 98 %

## 2020-01-01 VITALS
HEIGHT: 66 IN | BODY MASS INDEX: 20.25 KG/M2 | HEART RATE: 78 BPM | RESPIRATION RATE: 18 BRPM | WEIGHT: 126 LBS | OXYGEN SATURATION: 97 % | DIASTOLIC BLOOD PRESSURE: 80 MMHG | TEMPERATURE: 97.4 F | SYSTOLIC BLOOD PRESSURE: 110 MMHG

## 2020-01-01 VITALS
OXYGEN SATURATION: 97 % | DIASTOLIC BLOOD PRESSURE: 76 MMHG | RESPIRATION RATE: 18 BRPM | TEMPERATURE: 97 F | WEIGHT: 126 LBS | SYSTOLIC BLOOD PRESSURE: 110 MMHG | HEIGHT: 66 IN | BODY MASS INDEX: 20.25 KG/M2 | HEART RATE: 76 BPM

## 2020-01-01 VITALS
HEIGHT: 66 IN | HEART RATE: 77 BPM | OXYGEN SATURATION: 97 % | SYSTOLIC BLOOD PRESSURE: 102 MMHG | WEIGHT: 125 LBS | BODY MASS INDEX: 20.09 KG/M2 | RESPIRATION RATE: 18 BRPM | TEMPERATURE: 98.1 F | DIASTOLIC BLOOD PRESSURE: 60 MMHG

## 2020-01-01 VITALS
TEMPERATURE: 98 F | RESPIRATION RATE: 18 BRPM | DIASTOLIC BLOOD PRESSURE: 97 MMHG | SYSTOLIC BLOOD PRESSURE: 156 MMHG | HEART RATE: 83 BPM | OXYGEN SATURATION: 94 %

## 2020-01-01 VITALS
HEIGHT: 67 IN | RESPIRATION RATE: 18 BRPM | HEART RATE: 78 BPM | DIASTOLIC BLOOD PRESSURE: 94 MMHG | WEIGHT: 134.92 LBS | TEMPERATURE: 97 F | OXYGEN SATURATION: 96 % | SYSTOLIC BLOOD PRESSURE: 156 MMHG

## 2020-01-01 DIAGNOSIS — I10 ESSENTIAL (PRIMARY) HYPERTENSION: ICD-10-CM

## 2020-01-01 DIAGNOSIS — I35.0 NONRHEUMATIC AORTIC (VALVE) STENOSIS: ICD-10-CM

## 2020-01-01 DIAGNOSIS — R05 COUGH: ICD-10-CM

## 2020-01-01 DIAGNOSIS — I67.82 CEREBRAL ISCHEMIA: ICD-10-CM

## 2020-01-01 DIAGNOSIS — F51.04 PSYCHOPHYSIOLOGIC INSOMNIA: ICD-10-CM

## 2020-01-01 DIAGNOSIS — H61.23 IMPACTED CERUMEN, BILATERAL: ICD-10-CM

## 2020-01-01 DIAGNOSIS — E03.9 HYPOTHYROIDISM, UNSPECIFIED: ICD-10-CM

## 2020-01-01 DIAGNOSIS — H35.52 PIGMENTARY RETINAL DYSTROPHY: ICD-10-CM

## 2020-01-01 DIAGNOSIS — I48.91 UNSPECIFIED ATRIAL FIBRILLATION: ICD-10-CM

## 2020-01-01 DIAGNOSIS — K62.3 RECTAL PROLAPSE: ICD-10-CM

## 2020-01-01 DIAGNOSIS — E87.1 HYPO-OSMOLALITY AND HYPONATREMIA: ICD-10-CM

## 2020-01-01 DIAGNOSIS — F03.90 UNSPECIFIED DEMENTIA W/OUT BEHAVIORAL DISTURBANCE: ICD-10-CM

## 2020-01-01 DIAGNOSIS — M19.90 UNSPECIFIED OSTEOARTHRITIS, UNSPECIFIED SITE: ICD-10-CM

## 2020-01-01 DIAGNOSIS — Z79.01 UNSPECIFIED ATRIAL FIBRILLATION: ICD-10-CM

## 2020-01-01 DIAGNOSIS — M54.9 DORSALGIA, UNSPECIFIED: ICD-10-CM

## 2020-01-01 DIAGNOSIS — S72.012A: ICD-10-CM

## 2020-01-01 DIAGNOSIS — M85.80 OTHER SPECIFIED DISORDERS OF BONE DENSITY AND STRUCTURE, UNSPECIFIED SITE: ICD-10-CM

## 2020-01-01 DIAGNOSIS — Z51.5 ENCOUNTER FOR PALLIATIVE CARE: ICD-10-CM

## 2020-01-01 DIAGNOSIS — R19.7 DIARRHEA, UNSPECIFIED: ICD-10-CM

## 2020-01-01 DIAGNOSIS — M15.0 PRIMARY GENERALIZED (OSTEO)ARTHRITIS: ICD-10-CM

## 2020-01-01 DIAGNOSIS — N39.0 URINARY TRACT INFECTION, SITE NOT SPECIFIED: ICD-10-CM

## 2020-01-01 DIAGNOSIS — Z79.899 OTHER LONG TERM (CURRENT) DRUG THERAPY: ICD-10-CM

## 2020-01-01 LAB
25(OH)D3 SERPL-MCNC: 54.5 NG/ML
ALBUMIN SERPL ELPH-MCNC: 4 G/DL
ALBUMIN SERPL ELPH-MCNC: 4.4 G/DL
ALP BLD-CCNC: 113 U/L
ALP BLD-CCNC: 150 U/L
ALT SERPL-CCNC: 25 U/L
ALT SERPL-CCNC: 38 U/L
ANION GAP SERPL CALC-SCNC: 14 MMOL/L
ANION GAP SERPL CALC-SCNC: 17 MMOL/L
AST SERPL-CCNC: 33 U/L
AST SERPL-CCNC: 44 U/L
BASOPHILS # BLD AUTO: 0 K/UL
BASOPHILS # BLD AUTO: 0.06 K/UL
BASOPHILS NFR BLD AUTO: 0 %
BASOPHILS NFR BLD AUTO: 0.6 %
BILIRUB SERPL-MCNC: 1.3 MG/DL
BILIRUB SERPL-MCNC: 1.4 MG/DL
BUN SERPL-MCNC: 22 MG/DL
BUN SERPL-MCNC: 29 MG/DL
C DIFF TOX B STL QL CT TISS CULT: NORMAL
CALCIUM SERPL-MCNC: 9.5 MG/DL
CALCIUM SERPL-MCNC: 9.6 MG/DL
CHLORIDE SERPL-SCNC: 102 MMOL/L
CHLORIDE SERPL-SCNC: 90 MMOL/L
CHOLEST SERPL-MCNC: 171 MG/DL
CHOLEST/HDLC SERPL: 3.4 RATIO
CO2 SERPL-SCNC: 20 MMOL/L
CO2 SERPL-SCNC: 24 MMOL/L
CREAT SERPL-MCNC: 0.87 MG/DL
CREAT SERPL-MCNC: 0.9 MG/DL
EOSINOPHIL # BLD AUTO: 0 K/UL
EOSINOPHIL # BLD AUTO: 0.08 K/UL
EOSINOPHIL NFR BLD AUTO: 0 %
EOSINOPHIL NFR BLD AUTO: 0.9 %
ESTIMATED AVERAGE GLUCOSE: 126 MG/DL
FOLATE SERPL-MCNC: 12.4 NG/ML
GLUCOSE SERPL-MCNC: 133 MG/DL
GLUCOSE SERPL-MCNC: 162 MG/DL
HBA1C MFR BLD HPLC: 6 %
HCT VFR BLD CALC: 39.2 %
HCT VFR BLD CALC: 40 %
HDLC SERPL-MCNC: 50 MG/DL
HGB BLD-MCNC: 11.6 G/DL
HGB BLD-MCNC: 12.4 G/DL
IMM GRANULOCYTES NFR BLD AUTO: 0.5 %
INR PPP: 1.67 RATIO
INR PPP: 1.89 RATIO
INR PPP: 1.91 RATIO
INR PPP: 2.21 RATIO
INR PPP: 2.32 RATIO
INR PPP: 2.68 RATIO
INR PPP: 2.7 RATIO
INR PPP: 3.32 RATIO
INR PPP: 3.57 RATIO
INR PPP: 3.79 RATIO
LDLC SERPL CALC-MCNC: 107 MG/DL
LYMPHOCYTES # BLD AUTO: 1.08 K/UL
LYMPHOCYTES # BLD AUTO: 1.84 K/UL
LYMPHOCYTES NFR BLD AUTO: 13.1 %
LYMPHOCYTES NFR BLD AUTO: 19.8 %
Lab: NORMAL
MAN DIFF?: NORMAL
MAN DIFF?: NORMAL
MCHC RBC-ENTMCNC: 23.7 PG
MCHC RBC-ENTMCNC: 26.4 PG
MCHC RBC-ENTMCNC: 29.6 GM/DL
MCHC RBC-ENTMCNC: 31 GM/DL
MCV RBC AUTO: 80.2 FL
MCV RBC AUTO: 85.1 FL
MONOCYTES # BLD AUTO: 0.64 K/UL
MONOCYTES # BLD AUTO: 0.67 K/UL
MONOCYTES NFR BLD AUTO: 7.2 %
MONOCYTES NFR BLD AUTO: 7.8 %
NEUTROPHILS # BLD AUTO: 6.36 K/UL
NEUTROPHILS # BLD AUTO: 6.58 K/UL
NEUTROPHILS NFR BLD AUTO: 71 %
NEUTROPHILS NFR BLD AUTO: 77.4 %
PLATELET # BLD AUTO: 260 K/UL
PLATELET # BLD AUTO: 282 K/UL
POTASSIUM SERPL-SCNC: 5 MMOL/L
POTASSIUM SERPL-SCNC: 5.1 MMOL/L
PROT SERPL-MCNC: 6.2 G/DL
PROT SERPL-MCNC: 7.2 G/DL
PT BLD: 19.3 SEC
PT BLD: 22.2 SEC
PT BLD: 22.2 SEC
PT BLD: 26 SEC
PT BLD: 26.9 SEC
PT BLD: 31.5 SEC
PT BLD: 31.8 SEC
PT BLD: 39.3 SEC
PT BLD: 41.9 SEC
PT BLD: 45 SEC
RBC # BLD: 4.7 M/UL
RBC # BLD: 4.89 M/UL
RBC # FLD: 17.1 %
RBC # FLD: 19.6 %
SODIUM SERPL-SCNC: 127 MMOL/L
SODIUM SERPL-SCNC: 139 MMOL/L
TRIGL SERPL-MCNC: 71 MG/DL
TSH SERPL-ACNC: 4.52 UIU/ML
TSH SERPL-ACNC: 4.54 UIU/ML
VIT B12 SERPL-MCNC: 1411 PG/ML
WBC # FLD AUTO: 8.22 K/UL
WBC # FLD AUTO: 9.28 K/UL

## 2020-01-01 PROCEDURE — 99284 EMERGENCY DEPT VISIT MOD MDM: CPT | Mod: 25

## 2020-01-01 PROCEDURE — 69210 REMOVE IMPACTED EAR WAX UNI: CPT

## 2020-01-01 PROCEDURE — 99348 HOME/RES VST EST LOW MDM 30: CPT

## 2020-01-01 PROCEDURE — 99349 HOME/RES VST EST MOD MDM 40: CPT

## 2020-01-01 PROCEDURE — 99349 HOME/RES VST EST MOD MDM 40: CPT | Mod: 25

## 2020-01-01 PROCEDURE — 99350 HOME/RES VST EST HIGH MDM 60: CPT

## 2020-01-01 PROCEDURE — 99283 EMERGENCY DEPT VISIT LOW MDM: CPT

## 2020-01-01 RX ORDER — OXYCODONE AND ACETAMINOPHEN 5; 325 MG/1; MG/1
5-325 TABLET ORAL
Qty: 60 | Refills: 0 | Status: ACTIVE | COMMUNITY
Start: 2020-01-01 | End: 1900-01-01

## 2020-01-01 RX ORDER — TRAZODONE HYDROCHLORIDE 50 MG/1
50 TABLET ORAL
Qty: 90 | Refills: 3 | Status: ACTIVE | COMMUNITY
Start: 2020-01-01 | End: 1900-01-01

## 2020-01-01 RX ORDER — CHOLECALCIFEROL (VITAMIN D3) 25 MCG
25 MCG TABLET ORAL
Qty: 90 | Refills: 3 | Status: ACTIVE | COMMUNITY
Start: 2020-01-01 | End: 1900-01-01

## 2020-01-01 RX ORDER — WARFARIN 1 MG/1
1 TABLET ORAL
Qty: 60 | Refills: 0 | Status: ACTIVE | COMMUNITY
Start: 2019-01-01 | End: 1900-01-01

## 2020-01-01 RX ORDER — RISPERIDONE 0.25 MG/1
0.25 TABLET, FILM COATED ORAL TWICE DAILY
Qty: 120 | Refills: 3 | Status: DISCONTINUED | COMMUNITY
Start: 2020-01-01 | End: 2020-01-01

## 2020-01-01 RX ORDER — NORMAL SALT TABLETS 1 G/G
1 TABLET ORAL TWICE DAILY
Qty: 10 | Refills: 0 | Status: ACTIVE | COMMUNITY
Start: 2020-01-01 | End: 1900-01-01

## 2020-02-04 PROBLEM — M85.80 OSTEOPENIA: Status: ACTIVE | Noted: 2019-01-01

## 2020-02-04 NOTE — PHYSICAL EXAM
[No Acute Distress] : no acute distress [Well Nourished] : well nourished [Well Developed] : well developed [EOMI] : extra ocular movement intact [Normal Sclera/Conjunctiva] : normal sclera/conjunctiva [Normal Outer Ear/Nose] : the ears and nose were normal in appearance [Normal Oropharynx] : the oropharynx was normal [No Respiratory Distress] : no respiratory distress [Clear to Auscultation] : lungs were clear to auscultation bilaterally [Normal Rate] : heart rate was normal  [No Accessory Muscle Use] : no accessory muscle use [Regular Rhythm] : with a regular rhythm [Normal S1, S2] : normal S1 and S2 [No Edema] : there was no peripheral edema [No Murmurs] : no murmurs heard [Normal Bowel Sounds] : normal bowel sounds [Non Tender] : non-tender [Not Distended] : not distended [Soft] : abdomen soft [Normal Post Cervical Nodes] : no posterior cervical lymphadenopathy [Normal Anterior Cervical Nodes] : no anterior cervical lymphadenopathy [No Spinal Tenderness] : no spinal tenderness [No CVA Tenderness] : no ~M costovertebral angle tenderness [Normal Gait] : normal gait [Normal Strength/Tone] : muscle strength and tone were normal [No Rash] : no rash [Oriented x3] : oriented to person, place, and time [Normal Affect] : the affect was normal [de-identified] : skin tear right shin no evidence of infection

## 2020-02-04 NOTE — HISTORY OF PRESENT ILLNESS
[Patient] : patient [Family Member] : family member [FreeTextEntry1] : hypertension, atrial fibrillation, hypothyroidism, glaucoma, and retinitis pigmentosa who presents with L femoral neck fracture [FreeTextEntry2] : JOSE LUIS CHRISTIANSON is an 89 year with hypertension, atrial fibrillation, hypothyroidism, glaucoma, and retinitis pigmentosa who presents with L femoral neck fracture seen today for follow up visit on chronic medical issues\par \par Accompanying patient today is son Chad\par \par On last visit no issues.  Vision changing as she has retinitis pigmentosa.  According to patient and son she has been evaluated by an eye specialist with no additional treatment. \par \par Currently notes occasional diarrhea.  Has been taking Pepto with some relief.  \par   \par Since hospitalization patient/ patient's caregiver reported decline as evident by loss of appetite, lethargy and most recently a fall that appeared to be mechanical.  However HHA and son report she has been eating more currently\par Patient/ patient's caregiver reports no weight loss >10lbs in the past 6 months. No changes in dentition or swallowing reported, No changes in hearing or vision reported. No changes in Cognition reported. Patient denies any symptoms of depression or anxiety. Patient is ADL and IADL dependent. One fall reported- currently receiving physical therapy assistance. \par Patient's home environment is safe. \par Goals of care discussed. MOLST completed. \par \par \par \par \par

## 2020-02-04 NOTE — CHRONIC CARE ASSESSMENT
[PPS Score: ____] : Palliative Performance Scale (PPS) Score: [unfilled] [Limited decision making ability] : limited decision making ability [de-identified] : diet as tolerated

## 2020-02-04 NOTE — HEALTH RISK ASSESSMENT
[HRA Reviewed] : Health risk assessment reviewed [Full assistance needed] : managing finances [Yes] : The patient does have visual impairment [One fall no injury in past year] : Patient reported one fall in the past year without injury [TimeGetUpGo] : 0

## 2020-02-04 NOTE — COUNSELING
[Normal Weight - ( BMI  <25 )] : normal weight - ( BMI  <25 ) [Hypertension self management education material provided] : hypertension self management education material provided [Non - Smoker] : non-smoker [Use assistive device to avoid falls] : use assistive device to avoid falls [] : foot exam [Patient not on disease-modifying anti-rheumatic drug due to overall prognosis] : Patient not on disease-modifying anti-rheumatic drug due to overall prognosis [Not Recommended] : Aspirin use not recommended due to overall prognosis [Improve mobility] : improve mobility [Decrease hospital use] : decrease hospital use [Discussed disease trajectory with patient/caregiver] : discussed disease trajectory with patient/caregiver [Patient/Caregiver not ready to engage in discussion] : patient/caregiver not ready to engage in discussion [FreeTextEntry4] : Educated patient/legal representative about CCM services and consent.\par Educated patient/legal representative that this service is available because they have 2 or more chronic conditions, that only one Provider can furnish CCM Services per calendar month and that CCM services are subject to the usual Medicare deductible and coinsurance. \par Patient/legal representative understands the right to stop the CCM services at any time by notifying [facility] office.\par Patient/legal representative has verbally consented 10/17/19\par \par

## 2020-02-19 PROBLEM — H61.23 BILATERAL IMPACTED CERUMEN: Status: ACTIVE | Noted: 2020-01-01

## 2020-02-19 NOTE — REVIEW OF SYSTEMS
[Patient Intake Form Reviewed] : Patient intake form was reviewed [Negative] : Heme/Lymph [FreeTextEntry4] : Cerumen impaction

## 2020-02-19 NOTE — HISTORY OF PRESENT ILLNESS
[Patient] : patient [Family Member] : family member [FreeTextEntry1] : hypertension, atrial fibrillation, hypothyroidism, glaucoma, and retinitis pigmentosa who presents with L femoral neck fracture [FreeTextEntry2] : JOSE LUIS CHRISTIANSON is an 89 year with hypertension, atrial fibrillation, hypothyroidism, glaucoma, and retinitis pigmentosa who presents with L femoral neck fracture seen today for acute visit\par \par Accompanying patient today is son Chad\par \par On last visit patient noted occasional soft stool.  Has been taking Pepto with some relief.  \par \par Currently noted to have an INR of 3.57 on 2/14 coumadin held checked on 2/16 INR was 2.21.  Patient denies blood in the urine or stool.  stable.  Also noted ear wax\par   \par Since hospitalization patient/ patient's caregiver reported decline as evident by loss of appetite, lethargy and most recently a fall that appeared to be mechanical.  However HHA and son report she has been eating more currently\par Patient/ patient's caregiver reports no weight loss >10lbs in the past 6 months. No changes in dentition or swallowing reported, No changes in hearing or vision reported. No changes in Cognition reported. Patient denies any symptoms of depression or anxiety. Patient is ADL and IADL dependent. One fall reported- currently receiving physical therapy assistance. \par Patient's home environment is safe. \par Goals of care discussed. MOLST completed. \par \par \par \par \par

## 2020-02-19 NOTE — PHYSICAL EXAM
[No Acute Distress] : no acute distress [Well Nourished] : well nourished [Normal Sclera/Conjunctiva] : normal sclera/conjunctiva [Well Developed] : well developed [EOMI] : extra ocular movement intact [Normal Outer Ear/Nose] : the ears and nose were normal in appearance [No Respiratory Distress] : no respiratory distress [Normal Oropharynx] : the oropharynx was normal [Clear to Auscultation] : lungs were clear to auscultation bilaterally [No Accessory Muscle Use] : no accessory muscle use [Normal Rate] : heart rate was normal  [Regular Rhythm] : with a regular rhythm [No Murmurs] : no murmurs heard [No Edema] : there was no peripheral edema [Normal S1, S2] : normal S1 and S2 [Not Distended] : not distended [Non Tender] : non-tender [Soft] : abdomen soft [Normal Bowel Sounds] : normal bowel sounds [Normal Post Cervical Nodes] : no posterior cervical lymphadenopathy [Normal Anterior Cervical Nodes] : no anterior cervical lymphadenopathy [No CVA Tenderness] : no ~M costovertebral angle tenderness [Normal Gait] : normal gait [No Spinal Tenderness] : no spinal tenderness [Normal Strength/Tone] : muscle strength and tone were normal [No Rash] : no rash [Oriented x3] : oriented to person, place, and time [Normal Affect] : the affect was normal [de-identified] : TM not visible increased cerumen [de-identified] : skin tear right shin no evidence of infection

## 2020-03-02 PROBLEM — H35.52 RETINITIS PIGMENTOSA: Status: ACTIVE | Noted: 2018-04-24

## 2020-03-02 PROBLEM — R05 COUGH: Status: ACTIVE | Noted: 2020-01-01

## 2020-03-02 PROBLEM — I67.82 ISCHEMIC CEREBROVASCULAR DISEASE: Status: ACTIVE | Noted: 2019-01-01

## 2020-03-02 PROBLEM — R19.7 ACUTE DIARRHEA: Status: ACTIVE | Noted: 2020-01-01

## 2020-03-02 NOTE — HISTORY OF PRESENT ILLNESS
[FreeTextEntry1] : hypertension, atrial fibrillation, hypothyroidism, glaucoma, and retinitis pigmentosa who presents with L femoral neck fracture [FreeTextEntry2] : JOSE LUIS CHRISTIANSON is an 89 year with hypertension, atrial fibrillation, hypothyroidism, glaucoma, and retinitis pigmentosa who presents with L femoral neck fracture seen today for follow up visit on chronic medical issues\par \par Accompanying patient today is son Chad\par \par On last visit noted occasional diarrhea.  Has been taking Pepto with some relief.  \par \par Currently now with a cough and dyspnea family placed the patient on 2 liter of O2.  No chest pain or dyspnea during our visit.  Verbalizing needs and concerns.  Yesterday disorientated with a challenge to walk.  Today no issues.  Son worried maybe a TIA. \par   \par Since hospitalization patient/ patient's caregiver reported decline as evident by loss of appetite, lethargy and most recently a fall that appeared to be mechanical.  However HHA and son report she has been eating more currently\par Patient/ patient's caregiver reports no weight loss >10lbs in the past 6 months. No changes in dentition or swallowing reported, No changes in hearing or vision reported. No changes in Cognition reported. Patient denies any symptoms of depression or anxiety. Patient is ADL and IADL dependent. One fall reported- currently receiving physical therapy assistance. \par Patient's home environment is safe. \par Goals of care discussed. MOLST completed. \par \par \par \par \par

## 2020-03-02 NOTE — REASON FOR VISIT
[FreeTextEntry1] : hypertension, atrial fibrillation (on eliquis), hypothyroidism,\par glaucoma, and retinitis pigmentosa  [FreeTextEntry2] : HIE, facesheet

## 2020-03-08 NOTE — CONSULT NOTE ADULT - SUBJECTIVE AND OBJECTIVE BOX
Attending: Minerva    HPI: 89F PMHx CHF (EF 50-55%), A fib on coumadin, hypothyroidism, rectal prolapse x years presents with prolapsed rectum x 2-3 hours after a large bowel movement this afternoon. She intermittently has rectal prolapse, easily reduced by her health aide at home, but yesterday her health aide quit and her son at home was unsure how to reduce it. No fevers, chills, chest pain, dyspnea, nausea, vomiting, abdominal pain, rectal pain, diarrhea or constipation. Some loose bowel movements, about 1-2 times per day.    Never had a colonoscopy.  Stopped mammograms at age 65.    PMH: as above  PSH: hip replacement  Meds: synthroid, warfarin, metoprolol  Allerg: NKDA  SH: former smoker, rare drinker, no other drug use; retired   FH: noncontributory    T(C): 36.4 (03-08-20 @ 15:35), Max: 36.4 (03-08-20 @ 15:35)  HR: 83 (03-08-20 @ 15:35) (78 - 83)  BP: 156/97 (03-08-20 @ 15:35) (156/94 - 156/97)  RR: 18 (03-08-20 @ 15:35) (18 - 18)  SpO2: 94% (03-08-20 @ 15:35) (94% - 96%)    Physical Exam:  General: NAD, resting comfortably in bed  HEENT: MMM  Pulmonary: nonlabored breathing, normal resp effort  Cardiovascular: RRR  Abdominal: soft, nontender, nondistended  Rectal: pink rectal mucosa, circumferential rings, inner lumen, about 6cm of prolapse; easily reduced  Extremities: WWP, no edema, no calf tenderness  Neuro: no focal deficits  Psych: pleasant    Assessment: 89F PMHx A fib, CHF, hypothyroidism, rectal prolapse, p/w rectal prolapse, easily reduced on exam.    Recommendations:  - no surgical intervention indicated given age, comorbidities  - no need for surgical follow up  - discussed technique for reducing prolapse with son, encouraged hiring a home health aide capable of reducing prolapse at home  - discussed with chief on call, attending surgeon

## 2020-03-08 NOTE — ED PROVIDER NOTE - CLINICAL SUMMARY MEDICAL DECISION MAKING FREE TEXT BOX
surg consulted to assist w/ prolapse, they were able to easily reduce it. DC home in NAD with strict return precautions given.

## 2020-03-08 NOTE — ED PROVIDER NOTE - PHYSICAL EXAMINATION
CONSTITUTIONAL: Well-appearing; well-nourished; in no apparent distress.   HEAD: Normocephalic; atraumatic.   EYES:  conjunctiva and sclera clear  ENT: normal nose; no rhinorrhea;  NECK: Supple; full ROM  RESPIRATORY: Breathing easily; no resp difficulty  RECTAL: 6cm rectal prolapse  EXT: No cyanosis or edema;  SKIN: Normal for age and race; warm; dry; good turgor; no apparent lesions or rash.   NEURO: A & O x 3; face symmetric; grossly unremarkable.   PSYCHOLOGICAL: The patient’s mood and manner are appropriate.

## 2020-03-08 NOTE — ED ADULT TRIAGE NOTE - CHIEF COMPLAINT QUOTE
patient BIBA from home. she states that she was having a BM and now with prolapsed rectum. hx of this 2 times in the past. denies pain.

## 2020-03-08 NOTE — ED ADULT NURSE NOTE - NSIMPLEMENTINTERV_GEN_ALL_ED
Implemented All Fall with Harm Risk Interventions:  Redig to call system. Call bell, personal items and telephone within reach. Instruct patient to call for assistance. Room bathroom lighting operational. Non-slip footwear when patient is off stretcher. Physically safe environment: no spills, clutter or unnecessary equipment. Stretcher in lowest position, wheels locked, appropriate side rails in place. Provide visual cue, wrist band, yellow gown, etc. Monitor gait and stability. Monitor for mental status changes and reorient to person, place, and time. Review medications for side effects contributing to fall risk. Reinforce activity limits and safety measures with patient and family. Provide visual clues: red socks.

## 2020-03-08 NOTE — ED ADULT NURSE NOTE - OBJECTIVE STATEMENT
89 y/o female w/ hx of rectal prolapse, a-fib, and hypothyroidism presents to the ED c/o prolapsed rectum s/p BM. Denies pain. Denies any other sx.

## 2020-03-08 NOTE — ED PROVIDER NOTE - PATIENT PORTAL LINK FT
You can access the FollowMyHealth Patient Portal offered by [facility] by registering at the following website: [email or web address]. By joining [email or web address] FollowMyHealth portal, you will also be able to view your health information using other applications (apps) compatible with our system.

## 2020-03-08 NOTE — ED PROVIDER NOTE - NSFOLLOWUPINSTRUCTIONS_ED_ALL_ED_FT
Rectal Prolapse, Adult  Rectal prolapse happens when the inside of the last section of the large intestine (rectum) drops down into an abnormal position. There are two types of rectal prolapse:  Partial. In partial rectal prolapse, the inner lining of the rectum falls or sinks out of place and may stick out of the anus.Complete. In complete rectal prolapse, all layers of the rectum fall or sink out of place and may stick out of the anus.At first, rectal prolapse may be temporary. It may happen only when you are having a bowel movement. Over time, the prolapse will likely get worse. It may start to happen more often and cause uncomfortable symptoms. Eventually, the prolapse may happen when you are walking or standing. Surgery is often needed for this condition.  What are the causes?  This condition may be caused by weakness in the muscles that attach the rectum to the inside of the lower abdomen. The exact cause of this muscle weakness is often not known.  What increases the risk?  You are more likely to develop this condition if you:  Have a history of chronic constipation or diarrhea.Frequently strain to have a bowel movement.Are a woman who is 50 years of age or older.Have a neurological disorder, such as multiple sclerosis, or lower spinal cord injury.Are a woman who has been pregnant many times.Have had pelvic or rectal surgery.Have cystic fibrosis.What are the signs or symptoms?  The main symptom of this condition is a red mass of tissue sticking out of your anus. The mass may appear inflamed, have mucus, or bleed slightly.  At first, the mass may only appear after a bowel movement.The mass may then start to appear more often.Other symptoms may include:  Discomfort in the anus and rectum.Constipation.Feeling that stool is not completely emptied from the rectum.Diarrhea.Leaking of stool, mucus, or blood from the anus (fecal incontinence).Feeling like you are sitting on a ball.How is this diagnosed?  This condition may be diagnosed based on your symptoms and a physical exam.  During the exam, you may be asked to squat and strain as though you are having a bowel movement.You may also have tests, such as:  A rectal exam using a flexible scope (sigmoidoscopy or colonoscopy).A procedure that involves taking X-rays of your rectum after a dye (contrast material) is injected into the rectum (defecogram).How is this treated?  This condition is usually treated with surgery to repair the weakened muscles and to reconnect the rectum to attachments inside the lower abdomen. Other treatment options may include:  Pushing the prolapsed area back into the rectum (reduction).  Your health care provider may do this by gently pushing it back in using a moist cloth.The health care provider may show you how to do this at home if the prolapse occurs again.Medicines to prevent constipation and straining. This may include laxatives or stool softeners.Follow these instructions at home:  General instructions        Take over-the-counter and prescription medicines only as told by your health care provider.Do not strain to have a bowel movement.Do not lift anything that is heavier than 10 lb (4.5 kg), or the limit that you are told, until your health care provider says that it is safe.Follow instructions from your health care provider about what to do if the prolapse occurs again and does not go back in. This may involve lying on your side and using a moist cloth to gently press the lump into your rectum.Keep all follow-up visits as told by your health care provider. This is important.Preventing constipation     To prevent or treat constipation, your health care provider may recommend that you:  Drink enough fluid to keep your urine pale yellow.Take over-the-counter or prescription medicines.Eat foods that are high in fiber, such as beans, whole grains, and fresh fruits and vegetables.Limit foods that are high in fat and processed sugars, such as fried or sweet foods.Contact a health care provider if:  You have a fever.Your prolapse cannot be reduced at home.You have constipation or diarrhea.You have mild rectal bleeding.Get help right away if:  You have very bad rectal pain.You bleed heavily from your rectum.Summary  Rectal prolapse happens when the inside of the rectum drops down into an abnormal position. In some cases, the rectum may partially or completely push out through the anal opening.This condition is usually treated with surgery to repair the weakened muscles and to reconnect the rectum to attachments inside the lower abdomen.Take over-the-counter and prescription medicines only as told by your health care provider.Follow instructions from your health care provider about what to do if the prolapse occurs again and does not go back in.Get help right away if you have very bad rectal pain or if you bleed heavily from your rectum

## 2020-03-10 PROBLEM — K62.3 RECTAL PROLAPSE: Status: ACTIVE | Noted: 2020-01-01

## 2020-03-10 PROBLEM — M15.0 PRIMARY OSTEOARTHRITIS INVOLVING MULTIPLE JOINTS: Status: ACTIVE | Noted: 2020-01-01

## 2020-03-10 PROBLEM — I48.91 AFIB: Status: ACTIVE | Noted: 2018-04-24

## 2020-03-10 PROBLEM — S72.012A: Status: ACTIVE | Noted: 2019-01-01

## 2020-03-10 PROBLEM — I48.91 ON COUMADIN FOR ATRIAL FIBRILLATION: Status: ACTIVE | Noted: 2019-01-01

## 2020-03-10 PROBLEM — M19.90 DEGENERATIVE JOINT DISEASE: Status: ACTIVE | Noted: 2019-01-01

## 2020-03-10 PROBLEM — E87.1 HYPONATREMIA: Status: ACTIVE | Noted: 2020-01-01

## 2020-03-10 PROBLEM — E03.9 HYPOTHYROIDISM: Status: ACTIVE | Noted: 2018-04-24

## 2020-03-10 PROBLEM — I35.0 AORTIC STENOSIS: Status: ACTIVE | Noted: 2018-04-24

## 2020-03-10 PROBLEM — I10 BENIGN ESSENTIAL HTN: Status: ACTIVE | Noted: 2018-04-24

## 2020-03-10 NOTE — HISTORY OF PRESENT ILLNESS
[Patient] : patient [Family Member] : family member [FreeTextEntry1] : hypertension, atrial fibrillation, hypothyroidism, glaucoma, and retinitis pigmentosa who presents with L femoral neck fracture [FreeTextEntry2] : JOSE LUIS CHRISTIANSON is an 89 year with atrial fibrillation on Coumadin,Aortic Stenosis,  hypertension, hypothyroidism, glaucoma, and retinitis pigmentosa, L femoral neck fracture seen today for follow up visit status post emergency room visit for rectal prolapse.\par \par Accompanying patient today is son/Healthcare Proxy Chad Chritsianson.\par \par In the ER patient was evaluated and treated by colorectal surgery team. They recommended:\par - no surgical intervention indicated given age, comorbidities\par - no need for surgical follow up\par - discussed technique for reducing prolapse with son, encouraged hiring a home\par health aide capable of reducing prolapse at home\par \par Currently son reports patient with "good days and bad days." Patient observed to have increased shortness of breath with exertion and increase in home oxygen use. Patient also reports increased fatigue.\par \par No other complaints or events reported.\par

## 2020-03-10 NOTE — CHRONIC CARE ASSESSMENT
[Limited decision making ability] : limited decision making ability [PPS Score: ____] : Palliative Performance Scale (PPS) Score: [unfilled] [de-identified] : diet as tolerated

## 2020-03-10 NOTE — PHYSICAL EXAM
[No Acute Distress] : no acute distress [Well Nourished] : well nourished [Well Developed] : well developed [Normal Sclera/Conjunctiva] : normal sclera/conjunctiva [EOMI] : extra ocular movement intact [Normal Outer Ear/Nose] : the ears and nose were normal in appearance [Normal Oropharynx] : the oropharynx was normal [No Respiratory Distress] : no respiratory distress [Clear to Auscultation] : lungs were clear to auscultation bilaterally [No Accessory Muscle Use] : no accessory muscle use [Normal Rate] : heart rate was normal  [Regular Rhythm] : with a regular rhythm [Normal S1, S2] : normal S1 and S2 [No Murmurs] : no murmurs heard [No Edema] : there was no peripheral edema [Normal Bowel Sounds] : normal bowel sounds [Non Tender] : non-tender [Soft] : abdomen soft [Not Distended] : not distended [Normal Post Cervical Nodes] : no posterior cervical lymphadenopathy [Normal Anterior Cervical Nodes] : no anterior cervical lymphadenopathy [No CVA Tenderness] : no ~M costovertebral angle tenderness [No Spinal Tenderness] : no spinal tenderness [Normal Gait] : normal gait [Normal Strength/Tone] : muscle strength and tone were normal [No Rash] : no rash [Oriented x3] : oriented to person, place, and time [Normal Affect] : the affect was normal [de-identified] : skin tear right shin no evidence of infection

## 2020-03-10 NOTE — REASON FOR VISIT
[Acute] : an acute visit [Family Member] : family member [Pre-Visit Preparation] : pre-visit preparation was done [FreeTextEntry1] : status post emergency room visit 03/08/2020 for Rectal Prolapse\par glaucoma, and retinitis pigmentosa  [FreeTextEntry2] : ER discharge Summary

## 2020-03-18 PROBLEM — F03.90 PSYCHOSIS IN ELDERLY: Status: ACTIVE | Noted: 2020-01-01

## 2020-04-24 PROBLEM — M54.9 BACK PAIN: Status: ACTIVE | Noted: 2020-01-01

## 2020-05-04 PROBLEM — Z51.5 HOSPICE CARE PATIENT: Status: ACTIVE | Noted: 2020-01-01

## 2020-05-26 PROBLEM — F51.04 CHRONIC INSOMNIA: Status: ACTIVE | Noted: 2020-01-01

## 2020-07-24 NOTE — DISCHARGE NOTE ADULT - MEDICATION SUMMARY - MEDICATIONS TO CHANGE
Use Therapeutic Ranged Or Therapeutic Target: please select Range or Target Nosebleeds Normal Treatment: I explained this is common when taking isotretinoin. I recommended saline mist in each nostril multiple times a day. If this worsens they will contact us. Add High Risk Medication Management Associated Diagnosis?: No Retinoid Dermatitis Aggressive Treatment: I recommended more frequent application of Cetaphil or CeraVe to the areas of dermatitis. I also prescribed a topical steroid for twice daily use until the dermatitis resolves. Male Completion Statement: After discussing his treatment course we decided to discontinue isotretinoin therapy at this time. He shouldn't donate blood for one month after the last dose. He should call with any new symptoms of depression. Hypertriglyceridemia Treatment: I explained this is common when taking isotretinoin. If this worsens they will contact us. They may try OTC ibuprofen. Are Labs Available For Review?: Yes Kilograms Preamble Statement (Weight Entered In Details Tab): Reported Weight in kilograms: Xerosis Aggressive Treatment: I recommended application of Cetaphil or CeraVe numerous times a day going to bed to all dry areas. I also prescribed a topical steroid for twice daily use. Months Of Therapy Completed: 1 Retinoid Dermatitis Normal Treatment: I recommended more frequent application of Cetaphil or CeraVe to the areas of dermatitis. Cheilitis Aggressive Treatment: I recommended application of Vaseline or Aquaphor numerous times a day (as often as every hour) and before going to bed. I also prescribed a topical steroid for twice daily use. Xerosis Normal Treatment: I recommended application of Cetaphil or CeraVe numerous times a day and before going to bed to all dry areas. Most Recent Beta Hcg (Optional): at home UPT (-) 7/24/20 Myalgia Monitoring: I explained this is common when taking isotretinoin. If this worsens they will contact us. Pounds Preamble Statement (Weight Entered In Details Tab): Reported Weight in pounds: What Is The Patient's Gender: Female Female Pregnancy Counseling Text: Female patients should also be on two forms of birth control while taking this medication and for one month after their last dose. Dosing Month 1 (Required For Cumulative Dosing): 40mg Daily Xerosis Normal Treatment: I recommended application of Cetaphil or CeraVe numerous times a day going to bed to all dry areas. Cheilitis Normal Treatment: I recommended application of Vaseline or Aquaphor numerous times a day (as often as every hour) and before going to bed. Xerosis Aggressive Treatment: I recommended application of Cetaphil or CeraVe numerous times a day and before going to bed to all dry areas. I also prescribed a topical steroid for twice daily use. Headache Monitoring: I recommended monitoring the headaches for now. There is no evidence of increased intracranial pressure. They were instructed to call if the headaches are worsening. Detail Level: Zone Upper Range (In Mg/Kg): 150 Is Xerosis Present?: Yes - Normal Treatment Female Completion Statement: After discussing her treatment course we decided to discontinue isotretinoin therapy at this time. I explained that she would need to continue her birth control methods for at least one month after the last dosage. She should also get a pregnancy test one month after the last dose. She shouldn't donate blood for one month after the last dose. She should call with any new symptoms of depression. Lower Range (In Mg/Kg): 120 Counseling Text: I reviewed the side effect in detail. Patient should get monthly blood tests, not donate blood, not drive at night if vision affected, and not share medication. Next Month's Dosage: Continue Current Dosage Weight Units: pounds Comments: Pt had at home UPT (-) which she showed via telehealth which has been allowed by [employer] during covid.  Pt to continue wiht 40mg qday.  Since we are doing at home UPT pt advised to get labs done within the next month. Target Cumulative Dosage (In Mg/Kg): 135 Hypercholesterolemia Monitoring: I explained this is common when taking isotretinoin. We will monitor closely. Patient Weight (Optional But Required For Cumulative Dose-Numbers And Decimals Only): 110 I will SWITCH the dose or number of times a day I take the medications listed below when I get home from the hospital:  None

## 2020-09-21 NOTE — PRE-OP CHECKLIST - ASSESSMENT, HISTORY & PHYSICAL COMPLETED AND ON MEDICAL RECORD
Pt complain of \"indigestion\" and \"my chest hurts\", Dr Alla Mario notified, order for 12 lead received. 12 Lead EKg done. Dr Alla Mario called result. done

## 2020-12-23 PROBLEM — N39.0 ACUTE UTI: Status: RESOLVED | Noted: 2020-01-01 | Resolved: 2020-12-23

## 2022-10-24 NOTE — DISCHARGE NOTE ADULT - FUNCTIONAL SCREEN CURRENT LEVEL: BATHING, MLM
"Educated resident that a urine specimen is needed for testing. Pt stated "I can't go right now. I'll let you known when." Bed in low position; safety maintained. Will continue POC as ordered.   " (3) assistive equipment and person (2) assistive person

## 2023-10-23 NOTE — PATIENT PROFILE ADULT. - TOBACCO USE
Never smoker Bexarotene Counseling:  I discussed with the patient the risks of bexarotene including but not limited to hair loss, dry lips/skin/eyes, liver abnormalities, hyperlipidemia, pancreatitis, depression/suicidal ideation, photosensitivity, drug rash/allergic reactions, hypothyroidism, anemia, leukopenia, infection, cataracts, and teratogenicity.  Patient understands that they will need regular blood tests to check lipid profile, liver function tests, white blood cell count, thyroid function tests and pregnancy test if applicable.